# Patient Record
Sex: FEMALE | Race: WHITE | Employment: FULL TIME | URBAN - METROPOLITAN AREA
[De-identification: names, ages, dates, MRNs, and addresses within clinical notes are randomized per-mention and may not be internally consistent; named-entity substitution may affect disease eponyms.]

---

## 2017-01-17 ENCOUNTER — TELEPHONE (OUTPATIENT)
Dept: INTERNAL MEDICINE CLINIC | Facility: CLINIC | Age: 77
End: 2017-01-17

## 2017-01-17 DIAGNOSIS — Z12.31 SCREENING MAMMOGRAM, ENCOUNTER FOR: Primary | ICD-10-CM

## 2017-01-17 NOTE — TELEPHONE ENCOUNTER
Please fax order for mammogram please fax [phone] to [facility] in order for her to make an appointment

## 2017-03-10 RX ORDER — LISINOPRIL 20 MG/1
TABLET ORAL
Qty: 30 TABLET | Refills: 2 | OUTPATIENT
Start: 2017-03-10

## 2017-04-10 ENCOUNTER — TELEPHONE (OUTPATIENT)
Dept: INTERNAL MEDICINE CLINIC | Facility: CLINIC | Age: 77
End: 2017-04-10

## 2017-04-10 NOTE — TELEPHONE ENCOUNTER
Patient c/o severe dizziness that started yesterday morning. Can not get into office unable to drive would need evening appointment patient is asking for your advice.

## 2017-04-10 NOTE — TELEPHONE ENCOUNTER
Didn't get message earlier. Had severe vertigo yesterday, was bad earlier today and now better. When moving her head she was so nauseous she vomited. Better today. Recommend that she see ENT, Dr Celsa Rivera.

## 2017-05-31 RX ORDER — GABAPENTIN 100 MG/1
CAPSULE ORAL
Qty: 90 CAPSULE | Refills: 0 | Status: SHIPPED | OUTPATIENT
Start: 2017-05-31 | End: 2018-04-13

## 2017-06-26 ENCOUNTER — OFFICE VISIT (OUTPATIENT)
Dept: INTERNAL MEDICINE CLINIC | Facility: CLINIC | Age: 77
End: 2017-06-26

## 2017-06-26 VITALS
OXYGEN SATURATION: 99 % | WEIGHT: 143 LBS | TEMPERATURE: 98 F | BODY MASS INDEX: 25 KG/M2 | HEART RATE: 59 BPM | DIASTOLIC BLOOD PRESSURE: 86 MMHG | SYSTOLIC BLOOD PRESSURE: 160 MMHG

## 2017-06-26 DIAGNOSIS — I10 HYPERTENSION, BENIGN: Primary | ICD-10-CM

## 2017-06-26 PROCEDURE — 99212 OFFICE O/P EST SF 10 MIN: CPT | Performed by: INTERNAL MEDICINE

## 2017-06-26 PROCEDURE — 99213 OFFICE O/P EST LOW 20 MIN: CPT | Performed by: INTERNAL MEDICINE

## 2017-06-26 RX ORDER — HYDROCHLOROTHIAZIDE 12.5 MG/1
12.5 TABLET ORAL DAILY
Qty: 30 TABLET | Refills: 3 | Status: SHIPPED | OUTPATIENT
Start: 2017-06-26 | End: 2017-10-22

## 2017-06-26 NOTE — PROGRESS NOTES
HPI:    Patient ID: Ana Paula Alvarez is a 78year old female. HPI  Hypertension  Patient is here for follow up of hypertension. BP at home: 140 regularly Has been compliant with medications.   Exercise level: somewhat active and has been following low salt d Comment:Other reaction(s): tongue swells, mouth blisters  PHYSICAL EXAM:    Physical Exam   Vitals reviewed. Constitutional: She appears well-developed and well-nourished. HENT:   Head: Normocephalic and atraumatic.    Cardiovascular: Normal rate and re

## 2017-06-26 NOTE — PATIENT INSTRUCTIONS
ASSESSMENT/PLAN:   Diagnoses and all orders for this visit:    Hypertension, benign    BP overall running a little high here and also at home, will add small dose of HCTZ

## 2017-07-04 RX ORDER — ATENOLOL 50 MG/1
TABLET ORAL
Qty: 30 TABLET | Refills: 0 | Status: SHIPPED | OUTPATIENT
Start: 2017-07-04 | End: 2017-07-26

## 2017-07-26 ENCOUNTER — OFFICE VISIT (OUTPATIENT)
Dept: INTERNAL MEDICINE CLINIC | Facility: CLINIC | Age: 77
End: 2017-07-26

## 2017-07-26 VITALS
SYSTOLIC BLOOD PRESSURE: 98 MMHG | TEMPERATURE: 98 F | DIASTOLIC BLOOD PRESSURE: 60 MMHG | HEART RATE: 50 BPM | WEIGHT: 144 LBS | HEIGHT: 63 IN | BODY MASS INDEX: 25.52 KG/M2

## 2017-07-26 DIAGNOSIS — I10 HYPERTENSION, BENIGN: Primary | ICD-10-CM

## 2017-07-26 LAB
ANION GAP SERPL CALC-SCNC: 9 MMOL/L (ref 0–18)
BUN SERPL-MCNC: 16 MG/DL (ref 8–20)
BUN/CREAT SERPL: 12.3 (ref 10–20)
CALCIUM SERPL-MCNC: 9.2 MG/DL (ref 8.5–10.5)
CHLORIDE SERPL-SCNC: 93 MMOL/L (ref 95–110)
CO2 SERPL-SCNC: 29 MMOL/L (ref 22–32)
CREAT SERPL-MCNC: 1.3 MG/DL (ref 0.5–1.5)
GLUCOSE SERPL-MCNC: 98 MG/DL (ref 70–99)
OSMOLALITY UR CALC.SUM OF ELEC: 273 MOSM/KG (ref 275–295)
POTASSIUM SERPL-SCNC: 3.5 MMOL/L (ref 3.3–5.1)
SODIUM SERPL-SCNC: 131 MMOL/L (ref 136–144)

## 2017-07-26 PROCEDURE — 36415 COLL VENOUS BLD VENIPUNCTURE: CPT | Performed by: INTERNAL MEDICINE

## 2017-07-26 PROCEDURE — 99213 OFFICE O/P EST LOW 20 MIN: CPT | Performed by: INTERNAL MEDICINE

## 2017-07-26 PROCEDURE — 99212 OFFICE O/P EST SF 10 MIN: CPT | Performed by: INTERNAL MEDICINE

## 2017-07-26 RX ORDER — ATENOLOL 50 MG/1
50 TABLET ORAL
Qty: 30 TABLET | Refills: 6 | Status: SHIPPED | OUTPATIENT
Start: 2017-07-26 | End: 2017-12-01

## 2017-07-26 NOTE — PROGRESS NOTES
HPI:    Patient ID: Laurel Pollard is a 78year old female. HPI  Hypertension  Patient is here for follow up of hypertension. BP at home: not checking recently  Has been compliant with medications.   Exercise level: somewhat active and has been following l tongue swells, mouth blisters  Trimethoprim                Comment:Other reaction(s): tongue swells, mouth blisters  PHYSICAL EXAM:    Physical Exam   Vitals reviewed. Constitutional: She appears well-developed and well-nourished.    Cardiovascular: Nita Ulloa

## 2017-07-26 NOTE — PATIENT INSTRUCTIONS
ASSESSMENT/PLAN:   Diagnoses and all orders for this visit:    Hypertension, benign  BP doing well, in fact too low. Recommend that she take the water pill every other day.

## 2017-07-27 ENCOUNTER — TELEPHONE (OUTPATIENT)
Dept: INTERNAL MEDICINE CLINIC | Facility: CLINIC | Age: 77
End: 2017-07-27

## 2017-07-27 NOTE — TELEPHONE ENCOUNTER
Pt informed of results and instructions to take water pill eod and to repeat bmp in 2 weeks, order written.

## 2017-08-08 ENCOUNTER — NURSE ONLY (OUTPATIENT)
Dept: INTERNAL MEDICINE CLINIC | Facility: CLINIC | Age: 77
End: 2017-08-08

## 2017-08-08 DIAGNOSIS — I10 ESSENTIAL HYPERTENSION: ICD-10-CM

## 2017-08-08 LAB
ANION GAP SERPL CALC-SCNC: 7 MMOL/L (ref 0–18)
BUN SERPL-MCNC: 12 MG/DL (ref 8–20)
BUN/CREAT SERPL: 12.9 (ref 10–20)
CALCIUM SERPL-MCNC: 9.2 MG/DL (ref 8.5–10.5)
CHLORIDE SERPL-SCNC: 96 MMOL/L (ref 95–110)
CO2 SERPL-SCNC: 28 MMOL/L (ref 22–32)
CREAT SERPL-MCNC: 0.93 MG/DL (ref 0.5–1.5)
GLUCOSE SERPL-MCNC: 69 MG/DL (ref 70–99)
OSMOLALITY UR CALC.SUM OF ELEC: 270 MOSM/KG (ref 275–295)
POTASSIUM SERPL-SCNC: 4.2 MMOL/L (ref 3.3–5.1)
SODIUM SERPL-SCNC: 131 MMOL/L (ref 136–144)

## 2017-08-08 PROCEDURE — 36415 COLL VENOUS BLD VENIPUNCTURE: CPT | Performed by: INTERNAL MEDICINE

## 2017-10-06 RX ORDER — LISINOPRIL 20 MG/1
TABLET ORAL
Qty: 30 TABLET | Refills: 2 | Status: SHIPPED | OUTPATIENT
Start: 2017-10-06 | End: 2017-12-13

## 2017-10-09 ENCOUNTER — OFFICE VISIT (OUTPATIENT)
Dept: INTERNAL MEDICINE CLINIC | Facility: CLINIC | Age: 77
End: 2017-10-09

## 2017-10-09 VITALS
WEIGHT: 143 LBS | BODY MASS INDEX: 25.34 KG/M2 | HEIGHT: 63 IN | DIASTOLIC BLOOD PRESSURE: 71 MMHG | HEART RATE: 54 BPM | SYSTOLIC BLOOD PRESSURE: 119 MMHG

## 2017-10-09 DIAGNOSIS — I10 HYPERTENSION, BENIGN: Primary | ICD-10-CM

## 2017-10-09 PROCEDURE — 99212 OFFICE O/P EST SF 10 MIN: CPT | Performed by: INTERNAL MEDICINE

## 2017-10-09 PROCEDURE — 99213 OFFICE O/P EST LOW 20 MIN: CPT | Performed by: INTERNAL MEDICINE

## 2017-10-09 NOTE — PROGRESS NOTES
HPI:    Patient ID: Pauline Warner is a 78year old female. HPI  Hypertension  Patient is here for follow up of hypertension. BP at home: doing well, no further lightheadedness on the every other day diuretic  Has been compliant with medications.   Exercis Sulfamethoxazole            Comment:Other reaction(s): tongue swells, mouth blisters  Trimethoprim                Comment:Other reaction(s): tongue swells, mouth blisters  PHYSICAL EXAM:    Physical Exam   Vitals reviewed.    Constitutional: She appears w

## 2017-10-09 NOTE — PATIENT INSTRUCTIONS
ASSESSMENT/PLAN:   Diagnoses and all orders for this visit:    Hypertension, benign    BP overall doing well. To continue current meds. Recommend checking labs for her electrolytes.      Patient has been advised of the importance of flu vaccine but declines

## 2017-10-10 NOTE — PROGRESS NOTES
Labs normal, magnesium good but the potassium slightly on the low side, increase K foods, banana, OJ, potatoes, etc. Should help the cramping. Doesn't need recheck until next appt.  Cholesterol good, TG up slightly because she wasn't fully fasting

## 2017-10-23 RX ORDER — HYDROCHLOROTHIAZIDE 12.5 MG/1
TABLET ORAL
Qty: 30 TABLET | Refills: 3 | Status: SHIPPED | OUTPATIENT
Start: 2017-10-23 | End: 2018-02-15

## 2017-11-30 ENCOUNTER — TELEPHONE (OUTPATIENT)
Dept: INTERNAL MEDICINE CLINIC | Facility: CLINIC | Age: 77
End: 2017-11-30

## 2017-12-01 RX ORDER — METOPROLOL SUCCINATE 50 MG/1
50 TABLET, EXTENDED RELEASE ORAL DAILY
Qty: 30 TABLET | Refills: 3 | Status: SHIPPED | OUTPATIENT
Start: 2017-12-01 | End: 2018-03-18

## 2017-12-13 RX ORDER — LISINOPRIL 20 MG/1
TABLET ORAL
Qty: 30 TABLET | Refills: 2 | Status: SHIPPED | OUTPATIENT
Start: 2017-12-13 | End: 2018-03-18

## 2018-02-14 ENCOUNTER — TELEPHONE (OUTPATIENT)
Dept: INTERNAL MEDICINE CLINIC | Facility: CLINIC | Age: 78
End: 2018-02-14

## 2018-02-14 DIAGNOSIS — Z12.31 SCREENING MAMMOGRAM, ENCOUNTER FOR: Primary | ICD-10-CM

## 2018-02-15 RX ORDER — HYDROCHLOROTHIAZIDE 12.5 MG/1
TABLET ORAL
Qty: 30 TABLET | Refills: 1 | Status: SHIPPED | OUTPATIENT
Start: 2018-02-15 | End: 2018-04-12

## 2018-03-18 RX ORDER — LISINOPRIL 20 MG/1
TABLET ORAL
Qty: 90 TABLET | Refills: 0 | Status: SHIPPED | OUTPATIENT
Start: 2018-03-18 | End: 2018-04-13

## 2018-03-18 RX ORDER — METOPROLOL SUCCINATE 50 MG/1
50 TABLET, EXTENDED RELEASE ORAL DAILY
Qty: 90 TABLET | Refills: 0 | Status: SHIPPED | OUTPATIENT
Start: 2018-03-18 | End: 2018-04-13

## 2018-04-12 RX ORDER — HYDROCHLOROTHIAZIDE 12.5 MG/1
TABLET ORAL
Qty: 30 TABLET | Refills: 1 | Status: SHIPPED | OUTPATIENT
Start: 2018-04-12 | End: 2018-04-13

## 2018-04-13 ENCOUNTER — OFFICE VISIT (OUTPATIENT)
Dept: INTERNAL MEDICINE CLINIC | Facility: CLINIC | Age: 78
End: 2018-04-13

## 2018-04-13 VITALS
SYSTOLIC BLOOD PRESSURE: 124 MMHG | BODY MASS INDEX: 25.69 KG/M2 | HEART RATE: 53 BPM | RESPIRATION RATE: 12 BRPM | WEIGHT: 145 LBS | DIASTOLIC BLOOD PRESSURE: 74 MMHG | TEMPERATURE: 97 F | HEIGHT: 63 IN

## 2018-04-13 DIAGNOSIS — I10 HYPERTENSION, BENIGN: Primary | ICD-10-CM

## 2018-04-13 DIAGNOSIS — H61.23 CERUMEN DEBRIS ON TYMPANIC MEMBRANE OF BOTH EARS: ICD-10-CM

## 2018-04-13 PROCEDURE — 99212 OFFICE O/P EST SF 10 MIN: CPT | Performed by: INTERNAL MEDICINE

## 2018-04-13 PROCEDURE — 99213 OFFICE O/P EST LOW 20 MIN: CPT | Performed by: INTERNAL MEDICINE

## 2018-04-13 RX ORDER — GABAPENTIN 100 MG/1
CAPSULE ORAL
Qty: 270 CAPSULE | Refills: 1 | Status: SHIPPED | OUTPATIENT
Start: 2018-04-13 | End: 2019-02-16

## 2018-04-13 RX ORDER — METOPROLOL SUCCINATE 50 MG/1
50 TABLET, EXTENDED RELEASE ORAL DAILY
Qty: 90 TABLET | Refills: 1 | Status: SHIPPED | OUTPATIENT
Start: 2018-04-13 | End: 2018-12-17

## 2018-04-13 RX ORDER — HYDROCHLOROTHIAZIDE 12.5 MG/1
12.5 TABLET ORAL
Qty: 30 TABLET | Refills: 1 | Status: SHIPPED | OUTPATIENT
Start: 2018-04-13 | End: 2018-07-08 | Stop reason: SDUPTHER

## 2018-04-13 RX ORDER — DICLOFENAC SODIUM 75 MG/1
75 TABLET, DELAYED RELEASE ORAL 2 TIMES DAILY
Qty: 180 TABLET | Refills: 1 | Status: SHIPPED | OUTPATIENT
Start: 2018-04-13 | End: 2019-03-20

## 2018-04-13 RX ORDER — LISINOPRIL 20 MG/1
20 TABLET ORAL
Qty: 90 TABLET | Refills: 1 | Status: SHIPPED | OUTPATIENT
Start: 2018-04-13 | End: 2018-12-17

## 2018-04-13 NOTE — PATIENT INSTRUCTIONS
ASSESSMENT/PLAN:   Diagnoses and all orders for this visit:    Hypertension, benign  BP overall doing very well, continue current meds and regular exercise.     Cerumen debris on tympanic membrane of both ears  Irrigated clear

## 2018-04-13 NOTE — PROGRESS NOTES
HPI:    Patient ID: Ana Paula Alvarez is a 78year old female. HPI  Hypertension  Patient is here for follow up of hypertension. BP at home: doing well. Has been compliant with medications.   Exercise level: moderately active and has been following low salt triamcinolone acetonide (KENALOG) 0.1 % Apply Externally Ointment Apply thin film bid to effected area Disp: 30 g Rfl: 0     Allergies:  Bananas                   Sulfamethoxazole            Comment:Other reaction(s): tongue swells, mouth blisters  Trime

## 2018-07-08 RX ORDER — HYDROCHLOROTHIAZIDE 12.5 MG/1
TABLET ORAL
Qty: 90 TABLET | Refills: 0 | Status: SHIPPED | OUTPATIENT
Start: 2018-07-08 | End: 2019-03-18

## 2018-07-09 NOTE — TELEPHONE ENCOUNTER
Refill passed per [facility] protocol.   Hypertensive Medications  Protocol Criteria:  · Appointment scheduled in the past 6 months or in the next 3 months  · BMP or CMP in the past 12 months  · Creatinine result < 2  Recent Outpatient Visits

## 2018-12-05 ENCOUNTER — OFFICE VISIT (OUTPATIENT)
Dept: INTERNAL MEDICINE CLINIC | Facility: CLINIC | Age: 78
End: 2018-12-05
Payer: COMMERCIAL

## 2018-12-05 VITALS
SYSTOLIC BLOOD PRESSURE: 180 MMHG | HEART RATE: 60 BPM | OXYGEN SATURATION: 97 % | BODY MASS INDEX: 27.16 KG/M2 | DIASTOLIC BLOOD PRESSURE: 80 MMHG | HEIGHT: 62.5 IN | TEMPERATURE: 98 F | WEIGHT: 151.38 LBS

## 2018-12-05 DIAGNOSIS — I10 HTN (HYPERTENSION), BENIGN: ICD-10-CM

## 2018-12-05 DIAGNOSIS — M25.511 ACUTE PAIN OF RIGHT SHOULDER: Primary | ICD-10-CM

## 2018-12-05 PROCEDURE — 99214 OFFICE O/P EST MOD 30 MIN: CPT | Performed by: INTERNAL MEDICINE

## 2018-12-06 NOTE — PROGRESS NOTES
HPI:    Patient ID: Jani Panchal is a 70year old female. HPI right arm pain  To her this is ongoing for a couple of weeks now starts  from her shoulder move down to her arm,  there is limitation of movement.   There is no tingling or  numbness sensation Hematological: Negative for adenopathy. Does not bruise/bleed easily. Psychiatric/Behavioral: Negative for agitation, behavioral problems, confusion, hallucinations and sleep disturbance. The patient is not nervous/anxious.             Current Outpatient HENT:   Head: Normocephalic and atraumatic. Right Ear: Tympanic membrane and external ear normal. No drainage or tenderness. No mastoid tenderness. Tympanic membrane is not erythematous.    Left Ear: Tympanic membrane and external ear normal. No drainage She has no axillary adenopathy. Neurological: She is alert and oriented to person, place, and time. She has normal reflexes. No cranial nerve deficit. She exhibits normal muscle tone. Coordination normal.   Skin: Skin is warm, dry and intact.  No rash n

## 2018-12-14 ENCOUNTER — TELEPHONE (OUTPATIENT)
Dept: INTERNAL MEDICINE CLINIC | Facility: CLINIC | Age: 78
End: 2018-12-14

## 2018-12-14 NOTE — TELEPHONE ENCOUNTER
I spoke with her , I dont have the results, she will check with [facility] and ask them to fax or she will drop at the office

## 2018-12-14 NOTE — TELEPHONE ENCOUNTER
Her blood pressure noted on higher side, spoke with her about the blood pressure is on the higher side , will increase her llisinopril to 20 mg bid instead , check blood pressure at home an call with bp readings within one week

## 2018-12-14 NOTE — TELEPHONE ENCOUNTER
BLOOD PRESSURE NUMBERS    12/05/18 PM   180/80  12/05/18 PM    157/68  12/06/18 PM     151/65  12/09/18 PM      161/56  12/10/18  PM     172/78  12/11/18  PM     187/78  12/12/18   PM    174/78

## 2018-12-15 ENCOUNTER — TELEPHONE (OUTPATIENT)
Dept: INTERNAL MEDICINE CLINIC | Facility: CLINIC | Age: 78
End: 2018-12-15

## 2018-12-15 NOTE — TELEPHONE ENCOUNTER
Current Outpatient Medications:   ••  lisinopril 20 MG Oral Tab, Take 1 tablet (20 mg total) by mouth once daily. , Disp: 90 tablet, Rfl: 1  •  Metoprolol Succinate ER 50 MG Oral Tablet 24 Hr, Take 1 tablet (50 mg total) by mouth daily. , Disp: 90 tablet,

## 2018-12-15 NOTE — TELEPHONE ENCOUNTER
Patient came to the office to drop off  Xray results from [facility] (on your dek in [place]) patient is aware you are not in until Monday 12-18-18

## 2018-12-17 RX ORDER — METOPROLOL SUCCINATE 50 MG/1
50 TABLET, EXTENDED RELEASE ORAL DAILY
Qty: 90 TABLET | Refills: 1 | Status: SHIPPED | OUTPATIENT
Start: 2018-12-17 | End: 2019-06-15

## 2018-12-17 RX ORDER — LISINOPRIL 20 MG/1
20 TABLET ORAL
Qty: 90 TABLET | Refills: 1 | Status: SHIPPED | OUTPATIENT
Start: 2018-12-17 | End: 2019-06-15

## 2018-12-17 NOTE — TELEPHONE ENCOUNTER
I did review her xr , she does have  bone projections , enthesophyte  , at the attachment of  Tendon or ligament, and her pain can be due to that , if not better I can refer her to see ortho

## 2018-12-18 NOTE — TELEPHONE ENCOUNTER
I did talk to her  Informed about the xr results , she states that she is feeling better and she doesn't want ot see ortho  For  Now, if not better she will call us

## 2019-02-18 RX ORDER — GABAPENTIN 100 MG/1
CAPSULE ORAL
Qty: 270 CAPSULE | Refills: 1 | Status: SHIPPED | OUTPATIENT
Start: 2019-02-18 | End: 2019-08-14

## 2019-03-18 ENCOUNTER — TELEPHONE (OUTPATIENT)
Dept: INTERNAL MEDICINE CLINIC | Facility: CLINIC | Age: 79
End: 2019-03-18

## 2019-03-18 RX ORDER — HYDROCHLOROTHIAZIDE 12.5 MG/1
12.5 TABLET ORAL
Qty: 90 TABLET | Refills: 0 | Status: SHIPPED | OUTPATIENT
Start: 2019-03-18 | End: 2019-06-14

## 2019-03-19 ENCOUNTER — TELEPHONE (OUTPATIENT)
Dept: INTERNAL MEDICINE CLINIC | Facility: CLINIC | Age: 79
End: 2019-03-19

## 2019-03-19 NOTE — TELEPHONE ENCOUNTER
Current Outpatient Medications:   •  •  Diclofenac Sodium 75 MG Oral Tab EC, Take 1 tablet (75 mg total) by mouth 2 (two) times daily. , Disp: 180 tablet, Rfl: 1  •

## 2019-03-20 RX ORDER — DICLOFENAC SODIUM 75 MG/1
75 TABLET, DELAYED RELEASE ORAL 2 TIMES DAILY
Qty: 180 TABLET | Refills: 0 | Status: SHIPPED | OUTPATIENT
Start: 2019-03-20 | End: 2019-04-15

## 2019-04-01 ENCOUNTER — TELEPHONE (OUTPATIENT)
Dept: INTERNAL MEDICINE CLINIC | Facility: CLINIC | Age: 79
End: 2019-04-01

## 2019-04-01 NOTE — TELEPHONE ENCOUNTER
Patient called requesting order for bilateral yearly mammogram, please mail to patients home, she is going to [facility].

## 2019-04-12 ENCOUNTER — MED REC SCAN ONLY (OUTPATIENT)
Dept: INTERNAL MEDICINE CLINIC | Facility: CLINIC | Age: 79
End: 2019-04-12

## 2019-04-15 ENCOUNTER — OFFICE VISIT (OUTPATIENT)
Dept: INTERNAL MEDICINE CLINIC | Facility: CLINIC | Age: 79
End: 2019-04-15
Payer: COMMERCIAL

## 2019-04-15 VITALS
HEART RATE: 59 BPM | HEIGHT: 62.5 IN | TEMPERATURE: 98 F | BODY MASS INDEX: 26.91 KG/M2 | WEIGHT: 150 LBS | DIASTOLIC BLOOD PRESSURE: 90 MMHG | SYSTOLIC BLOOD PRESSURE: 150 MMHG

## 2019-04-15 DIAGNOSIS — E78.00 HYPERCHOLESTEROLEMIA: Primary | ICD-10-CM

## 2019-04-15 DIAGNOSIS — I10 ESSENTIAL HYPERTENSION: ICD-10-CM

## 2019-04-15 PROCEDURE — 99213 OFFICE O/P EST LOW 20 MIN: CPT | Performed by: INTERNAL MEDICINE

## 2019-04-15 RX ORDER — DICLOFENAC SODIUM 75 MG/1
75 TABLET, DELAYED RELEASE ORAL 2 TIMES DAILY
COMMUNITY
End: 2020-08-05

## 2019-04-15 NOTE — PATIENT INSTRUCTIONS
htn bp noted on the higher side - watch diet , avoid salty food, continue with metoprolol, lisinopril and hctz every day , check bp at home and call with bp readings within one week    See me in 3 months

## 2019-04-15 NOTE — PROGRESS NOTES
HPI:    Patient ID: Patria Velasquez is a 70year old female. HPI she is here today for follow up on her htn   She states that she is doing ok, she is checking her blood pressure at home and per her is around 140/75 .  She takes her medication regularly she hydrochlorothiazide 12.5 MG Oral Tab Take 1 tablet (12.5 mg total) by mouth once daily. Disp: 90 tablet Rfl: 0   GABAPENTIN 100 MG Oral Cap TAKE 1 CAPSULE (100 MG TOTAL) BY MOUTH 3 (THREE) TIMES DAILY.  Disp: 270 capsule Rfl: 1   Metoprolol Succinate ER 5 tenderness. Mouth/Throat: Uvula is midline, oropharynx is clear and moist and mucous membranes are normal. Mucous membranes are not cyanotic. No oropharyngeal exudate, posterior oropharyngeal edema or posterior oropharyngeal erythema.    Eyes: Pupils are within 1 week continue with metoprolol lisinopril and HCTZ every day instead of every other day.     Orders Placed This Encounter      Lipid Panel [E]      Comp Metabolic Panel (14)      Meds This Visit:  Requested Prescriptions      No prescriptions reques

## 2019-04-29 ENCOUNTER — TELEPHONE (OUTPATIENT)
Dept: INTERNAL MEDICINE CLINIC | Facility: CLINIC | Age: 79
End: 2019-04-29

## 2019-06-14 RX ORDER — HYDROCHLOROTHIAZIDE 12.5 MG/1
TABLET ORAL
Qty: 90 TABLET | Refills: 1 | Status: SHIPPED | OUTPATIENT
Start: 2019-06-14 | End: 2019-12-18

## 2019-06-14 NOTE — TELEPHONE ENCOUNTER
Please review; protocol failed. Requested Prescriptions     Pending Prescriptions Disp Refills   • Metoprolol Succinate ER 50 MG Oral Tablet 24 Hr 90 tablet 1     Sig: Take 1 tablet (50 mg total) by mouth daily.    • lisinopril 20 MG Oral Tab 90 tablet 1

## 2019-06-15 RX ORDER — METOPROLOL SUCCINATE 50 MG/1
50 TABLET, EXTENDED RELEASE ORAL DAILY
Qty: 90 TABLET | Refills: 1 | Status: SHIPPED | OUTPATIENT
Start: 2019-06-15 | End: 2019-12-18

## 2019-06-15 RX ORDER — LISINOPRIL 20 MG/1
20 TABLET ORAL
Qty: 90 TABLET | Refills: 1 | Status: SHIPPED | OUTPATIENT
Start: 2019-06-15 | End: 2019-12-18

## 2019-08-14 RX ORDER — GABAPENTIN 100 MG/1
100 CAPSULE ORAL 3 TIMES DAILY
Qty: 270 CAPSULE | Refills: 1 | Status: SHIPPED | OUTPATIENT
Start: 2019-08-14 | End: 2020-02-17

## 2019-08-14 NOTE — TELEPHONE ENCOUNTER
Rx request for Gabapentin 100 mg, please review and sign off if appropriate. Thank you.      Refill Protocol Appointment Criteria  · Appointment scheduled in the past 6 months or in the next 3 months  Recent Outpatient Visits            4 months ago Hyperch

## 2019-08-14 NOTE — TELEPHONE ENCOUNTER
Current Outpatient Medications:     •  GABAPENTIN 100 MG Oral Cap, TAKE 1 CAPSULE (100 MG TOTAL) BY MOUTH 3 (THREE) TIMES DAILY. , Disp: 270 capsule, Rfl: 1

## 2019-08-15 ENCOUNTER — TELEPHONE (OUTPATIENT)
Dept: INTERNAL MEDICINE CLINIC | Facility: CLINIC | Age: 79
End: 2019-08-15

## 2019-08-15 RX ORDER — DICLOFENAC SODIUM 75 MG/1
TABLET, DELAYED RELEASE ORAL
Qty: 90 TABLET | Refills: 1 | Status: SHIPPED | OUTPATIENT
Start: 2019-08-15 | End: 2020-02-18

## 2019-08-15 NOTE — TELEPHONE ENCOUNTER
Refill passed per [facility] protocol.   Refill Protocol Appointment Criteria  · Appointment scheduled in the past 6 months or in the next 3 months  Recent Outpatient Visits            4 months ago Hypercholesterolemia    [facility], [address],

## 2019-11-11 ENCOUNTER — OFFICE VISIT (OUTPATIENT)
Dept: INTERNAL MEDICINE CLINIC | Facility: CLINIC | Age: 79
End: 2019-11-11
Payer: COMMERCIAL

## 2019-11-11 VITALS
HEIGHT: 62.5 IN | SYSTOLIC BLOOD PRESSURE: 135 MMHG | DIASTOLIC BLOOD PRESSURE: 80 MMHG | TEMPERATURE: 98 F | HEART RATE: 57 BPM | WEIGHT: 150 LBS | RESPIRATION RATE: 18 BRPM | BODY MASS INDEX: 26.91 KG/M2

## 2019-11-11 DIAGNOSIS — Z00.00 ANNUAL PHYSICAL EXAM: Primary | ICD-10-CM

## 2019-11-11 DIAGNOSIS — Z28.21 IMMUNIZATION NOT CARRIED OUT BECAUSE OF PATIENT REFUSAL: ICD-10-CM

## 2019-11-11 PROCEDURE — 99213 OFFICE O/P EST LOW 20 MIN: CPT | Performed by: INTERNAL MEDICINE

## 2019-11-11 NOTE — PATIENT INSTRUCTIONS
htn - controlled, advised her to watch her diet , avoid salty food , check blood pressure at home and bring log book next visit .  Watch diet , avoid salty food , continue with current medication     blood work ,   Refusing flu shot

## 2019-11-11 NOTE — PROGRESS NOTES
HPI:    Patient ID: Dafne Alfaro is a 70year old female. HPI She is here for follow up on her htn.  She is not checking her blood pressure  at home regularly only once in a while and per her Is around 130   BP Readings from Last 3 Encounters:  11/11/19 by mouth 3 (three) times daily. 270 capsule 1   • Metoprolol Succinate ER 50 MG Oral Tablet 24 Hr Take 1 tablet (50 mg total) by mouth daily. 90 tablet 1   • lisinopril 20 MG Oral Tab Take 1 tablet (20 mg total) by mouth once daily.  90 tablet 1   • HYDROCH oropharynx is clear and moist and mucous membranes are normal. Mucous membranes are not cyanotic. No oropharyngeal exudate, posterior oropharyngeal edema or posterior oropharyngeal erythema. Eyes: Pupils are equal, round, and reactive to light.  Conjuncti this encounter.       Meds This Visit:  Requested Prescriptions      No prescriptions requested or ordered in this encounter       Imaging & Referrals:  None        CC#[id number]

## 2019-12-19 NOTE — TELEPHONE ENCOUNTER
Please review; protocol failed.     Requested Prescriptions     Pending Prescriptions Disp Refills   • LISINOPRIL 20 MG Oral Tab [Pharmacy Med Name: LISINOPRIL 20 MG TABLET] 30 tablet 5     Sig: TAKE 1 TABLET BY MOUTH EVERY DAY   • HYDROCHLOROTHIAZIDE 12.5

## 2019-12-20 RX ORDER — LISINOPRIL 20 MG/1
TABLET ORAL
Qty: 90 TABLET | Refills: 1 | Status: SHIPPED | OUTPATIENT
Start: 2019-12-20 | End: 2020-06-09

## 2019-12-20 RX ORDER — METOPROLOL SUCCINATE 50 MG/1
TABLET, EXTENDED RELEASE ORAL
Qty: 90 TABLET | Refills: 1 | Status: SHIPPED | OUTPATIENT
Start: 2019-12-20 | End: 2020-06-09

## 2019-12-20 RX ORDER — HYDROCHLOROTHIAZIDE 12.5 MG/1
TABLET ORAL
Qty: 90 TABLET | Refills: 1 | Status: SHIPPED | OUTPATIENT
Start: 2019-12-20 | End: 2020-06-09

## 2020-02-10 RX ORDER — ATORVASTATIN CALCIUM 20 MG/1
TABLET, FILM COATED ORAL
Qty: 90 TABLET | Refills: 1 | Status: SHIPPED | OUTPATIENT
Start: 2020-02-10 | End: 2020-08-01

## 2020-02-10 NOTE — TELEPHONE ENCOUNTER
Please review; protocol failed.     Cholesterol Medication Protocol Failed2/9 9:36 AM   Last LDL < 130

## 2020-02-17 RX ORDER — GABAPENTIN 100 MG/1
100 CAPSULE ORAL 3 TIMES DAILY
Qty: 270 CAPSULE | Refills: 1 | Status: SHIPPED | OUTPATIENT
Start: 2020-02-17 | End: 2020-05-13

## 2020-02-17 NOTE — TELEPHONE ENCOUNTER
Current Outpatient Medications:     •  gabapentin 100 MG Oral Cap, Take 1 capsule (100 mg total) by mouth 3 (three) times daily. , Disp: 270 capsule, Rfl: 1 REFILL

## 2020-02-17 NOTE — TELEPHONE ENCOUNTER
Refill passed per [facility], [facility] protocol.   Refill Protocol Appointment Criteria  · Appointment scheduled in the past 6 months or in the next 3 months  Recent Outpatient Visits            3 months ago Annual physical exam    [facility], [facility], [address],

## 2020-02-18 NOTE — TELEPHONE ENCOUNTER
Current Outpatient Medications:   •  •  DICLOFENAC SODIUM 75 MG Oral Tab EC, TAKE 1 TABLET BY MOUTH TWICE A DAY, Disp: 90 tablet, Rfl: 1  •

## 2020-02-19 RX ORDER — DICLOFENAC SODIUM 75 MG/1
75 TABLET, DELAYED RELEASE ORAL 2 TIMES DAILY
Qty: 90 TABLET | Refills: 1 | Status: SHIPPED | OUTPATIENT
Start: 2020-02-19 | End: 2020-05-10

## 2020-02-19 NOTE — TELEPHONE ENCOUNTER
Review pended refill request as it does not fall under a protocol.     Last Rx: 8/15/19  LOV: 3 months ago

## 2020-05-10 RX ORDER — DICLOFENAC SODIUM 75 MG/1
TABLET, DELAYED RELEASE ORAL
Qty: 60 TABLET | Refills: 2 | Status: SHIPPED | OUTPATIENT
Start: 2020-05-10 | End: 2020-05-13

## 2020-05-13 ENCOUNTER — TELEPHONE (OUTPATIENT)
Dept: INTERNAL MEDICINE CLINIC | Facility: CLINIC | Age: 80
End: 2020-05-13

## 2020-05-13 ENCOUNTER — OFFICE VISIT (OUTPATIENT)
Dept: INTERNAL MEDICINE CLINIC | Facility: CLINIC | Age: 80
End: 2020-05-13
Payer: COMMERCIAL

## 2020-05-13 VITALS
DIASTOLIC BLOOD PRESSURE: 75 MMHG | HEIGHT: 62 IN | OXYGEN SATURATION: 98 % | BODY MASS INDEX: 27.05 KG/M2 | TEMPERATURE: 98 F | WEIGHT: 147 LBS | HEART RATE: 59 BPM | SYSTOLIC BLOOD PRESSURE: 130 MMHG

## 2020-05-13 DIAGNOSIS — Z00.00 ANNUAL PHYSICAL EXAM: ICD-10-CM

## 2020-05-13 DIAGNOSIS — Z12.31 ENCOUNTER FOR SCREENING MAMMOGRAM FOR BREAST CANCER: Primary | ICD-10-CM

## 2020-05-13 DIAGNOSIS — Z78.0 MENOPAUSE: ICD-10-CM

## 2020-05-13 PROCEDURE — 99397 PER PM REEVAL EST PAT 65+ YR: CPT | Performed by: INTERNAL MEDICINE

## 2020-05-13 NOTE — PROGRESS NOTES
HPI:   Monique Alvarez is a 70year old female who presents for a complete physical exam.     Wt Readings from Last 3 Encounters:  05/13/20 : 147 lb (66.7 kg)  11/11/19 : 150 lb (68 kg)  04/15/19 : 150 lb (68 kg)    Body mass index is 26.89 kg/m².      Cholest REVIEW OF SYSTEMS:     Constitutional Negative Chills, fatigue and fever. ENMT Negative Hearing loss, nasal drainage, pain in/around ear, sinus pressure and sore throat. Eyes Negative Eye pain and vision changes.    Respiratory Negative Cough, dyspn gallops, or rubs   Vascular Normal Pulses - Dorsalis pedis: Normal. Bruits - Carotids: Absent. Extremity Normal No edema. No varicosities. Abdomen Normal Inspection normal, BS active.  No abdominal tenderness or masses palpable   Musculoskeletal Normal

## 2020-05-13 NOTE — TELEPHONE ENCOUNTER
Patient is calling to see if she can just go to [facility] to get her mammogram done? She has gone there for over 20 years so they have all her records.      Will she need a referral for this or can she just go for her annual mammogram? Patient state

## 2020-05-13 NOTE — TELEPHONE ENCOUNTER
Patient informed of mammogram order needed in order to have testing completed at [facility]. Copy of order mailed to patient.

## 2020-06-08 ENCOUNTER — TELEPHONE (OUTPATIENT)
Dept: INTERNAL MEDICINE CLINIC | Facility: CLINIC | Age: 80
End: 2020-06-08

## 2020-06-08 NOTE — TELEPHONE ENCOUNTER
Patient would like a copy of her mammogram order faxed to [facility] at fax: [phone].  They will not schedule an appointment for her until the receive the order for her mammogram.

## 2020-06-09 RX ORDER — METOPROLOL SUCCINATE 50 MG/1
TABLET, EXTENDED RELEASE ORAL
Qty: 30 TABLET | Refills: 5 | Status: SHIPPED | OUTPATIENT
Start: 2020-06-09 | End: 2020-12-19

## 2020-06-09 RX ORDER — LISINOPRIL 20 MG/1
TABLET ORAL
Qty: 30 TABLET | Refills: 5 | Status: SHIPPED | OUTPATIENT
Start: 2020-06-09 | End: 2020-12-19

## 2020-06-09 RX ORDER — HYDROCHLOROTHIAZIDE 12.5 MG/1
TABLET ORAL
Qty: 30 TABLET | Refills: 5 | Status: SHIPPED | OUTPATIENT
Start: 2020-06-09 | End: 2021-05-19

## 2020-07-02 ENCOUNTER — NURSE TRIAGE (OUTPATIENT)
Dept: INTERNAL MEDICINE CLINIC | Facility: CLINIC | Age: 80
End: 2020-07-02

## 2020-07-02 NOTE — TELEPHONE ENCOUNTER
Action Requested: Summary for Provider     []  Critical Lab, Recommendations Needed  [] Need Additional Advice  []   FYI    []   Need Orders  [] Need Medications Sent to Pharmacy  []  Other     SUMMARY:appt made, injured right thumb trying to open a window

## 2020-07-03 ENCOUNTER — OFFICE VISIT (OUTPATIENT)
Dept: INTERNAL MEDICINE CLINIC | Facility: CLINIC | Age: 80
End: 2020-07-03
Payer: COMMERCIAL

## 2020-07-03 ENCOUNTER — HOSPITAL ENCOUNTER (OUTPATIENT)
Dept: GENERAL RADIOLOGY | Facility: HOSPITAL | Age: 80
Discharge: HOME | End: 2020-07-03
Attending: INTERNAL MEDICINE
Payer: COMMERCIAL

## 2020-07-03 ENCOUNTER — TELEPHONE (OUTPATIENT)
Dept: INTERNAL MEDICINE CLINIC | Facility: CLINIC | Age: 80
End: 2020-07-03

## 2020-07-03 VITALS
DIASTOLIC BLOOD PRESSURE: 80 MMHG | BODY MASS INDEX: 26.55 KG/M2 | OXYGEN SATURATION: 98 % | HEART RATE: 76 BPM | WEIGHT: 148 LBS | SYSTOLIC BLOOD PRESSURE: 135 MMHG | HEIGHT: 62.5 IN

## 2020-07-03 DIAGNOSIS — S69.91XA INJURY OF RIGHT THUMB, INITIAL ENCOUNTER: Primary | ICD-10-CM

## 2020-07-03 DIAGNOSIS — E78.00 HYPERCHOLESTEROLEMIA: ICD-10-CM

## 2020-07-03 DIAGNOSIS — S69.91XA INJURY OF RIGHT THUMB, INITIAL ENCOUNTER: ICD-10-CM

## 2020-07-03 LAB
ALBUMIN SERPL-MCNC: 4.2 G/DL (ref 3.4–5)
ALBUMIN/GLOB SERPL: 1.4 {RATIO} (ref 1–2)
ALP LIVER SERPL-CCNC: 80 U/L (ref 55–142)
ALT SERPL-CCNC: 37 U/L (ref 13–56)
ANION GAP SERPL CALC-SCNC: 10 MMOL/L (ref 0–18)
AST SERPL-CCNC: 20 U/L (ref 15–37)
BILIRUB SERPL-MCNC: 0.5 MG/DL (ref 0.1–2)
BUN BLD-MCNC: 21 MG/DL (ref 7–18)
BUN/CREAT SERPL: 12.4 (ref 10–20)
CALCIUM BLD-MCNC: 9.4 MG/DL (ref 8.5–10.1)
CHLORIDE SERPL-SCNC: 97 MMOL/L (ref 98–112)
CHOLEST SMN-MCNC: 143 MG/DL (ref ?–200)
CO2 SERPL-SCNC: 27 MMOL/L (ref 21–32)
CREAT BLD-MCNC: 1.69 MG/DL (ref 0.55–1.02)
GLOBULIN PLAS-MCNC: 2.9 G/DL (ref 2.8–4.4)
GLUCOSE BLD-MCNC: 90 MG/DL (ref 70–99)
HDLC SERPL-MCNC: 49 MG/DL (ref 40–59)
LDLC SERPL CALC-MCNC: 55 MG/DL (ref ?–100)
M PROTEIN MFR SERPL ELPH: 7.1 G/DL (ref 6.4–8.2)
NONHDLC SERPL-MCNC: 94 MG/DL (ref ?–130)
OSMOLALITY SERPL CALC.SUM OF ELEC: 281 MOSM/KG (ref 275–295)
PATIENT FASTING Y/N/NP: YES
PATIENT FASTING Y/N/NP: YES
POTASSIUM SERPL-SCNC: 3.8 MMOL/L (ref 3.5–5.1)
SODIUM SERPL-SCNC: 134 MMOL/L (ref 136–145)
TRIGL SERPL-MCNC: 195 MG/DL (ref 30–149)
VLDLC SERPL CALC-MCNC: 39 MG/DL (ref 0–30)

## 2020-07-03 PROCEDURE — 73130 X-RAY EXAM OF HAND: CPT | Performed by: INTERNAL MEDICINE

## 2020-07-03 PROCEDURE — 99213 OFFICE O/P EST LOW 20 MIN: CPT | Performed by: INTERNAL MEDICINE

## 2020-07-03 PROCEDURE — 36415 COLL VENOUS BLD VENIPUNCTURE: CPT | Performed by: INTERNAL MEDICINE

## 2020-07-03 NOTE — PATIENT INSTRUCTIONS
Injury of right thumb, initial encounter  (primary encounter diagnosis) will order x-ray of the hand, ortho referal , pain meds as needed , use splint   Hypertension controlled advised to continue the same medication check blood pressure at home and bring

## 2020-07-03 NOTE — PROGRESS NOTES
HPI:    Patient ID: So Paige is a 70year old female. HPI She came today complaining of right thumb injury. According to her this happened last week. She was trying to open the window at that is when she injured her thumb.   She states that the karley • METOPROLOL SUCCINATE ER 50 MG Oral Tablet 24 Hr TAKE 1 TABLET BY MOUTH EVERY DAY 30 tablet 5   • HYDROCHLOROTHIAZIDE 12.5 MG Oral Tab TAKE 1 TABLET BY MOUTH EVERY DAY 30 tablet 5   • ATORVASTATIN 20 MG Oral Tab TAKE 1 TABLET BY MOUTH EVERY DAY AT NIGHT 9 Mouth/Throat: Uvula is midline, oropharynx is clear and moist and mucous membranes are normal. Mucous membranes are not cyanotic. No oropharyngeal exudate, posterior oropharyngeal edema or posterior oropharyngeal erythema.    Eyes: Pupils are equal, round, Injury of right thumb, initial encounter  (primary encounter diagnosis) will order x-ray of the hand, ortho referal , pain meds as needed , use splint   Hypertension controlled advised to continue the same medication check blood pressure at home and bring

## 2020-07-06 ENCOUNTER — OFFICE VISIT (OUTPATIENT)
Dept: ORTHOPEDICS CLINIC | Facility: CLINIC | Age: 80
End: 2020-07-06
Payer: COMMERCIAL

## 2020-07-06 DIAGNOSIS — S66.811A RUPTURE OF EXTENSOR TENDON OF RIGHT HAND, INITIAL ENCOUNTER: Primary | ICD-10-CM

## 2020-07-06 PROCEDURE — 99243 OFF/OP CNSLTJ NEW/EST LOW 30: CPT | Performed by: ORTHOPAEDIC SURGERY

## 2020-07-06 NOTE — H&P
NURSING INTAKE COMMENTS: Patient presents with:  Consult: Injured her right thumb on 6/27. Was trying to open a window and pulled too hard and injured her thumb. Saw Dr. Aruna Madrid on 7/3 and completed a xray right hand.   Stts she's unable to straighten her t blisters  Trimethoprim                Comment:Other reaction(s): tongue swells, mouth blisters  Family History   Problem Relation Age of Onset   • Hypertension Mother    • Breast Cancer Sister      No family Hx of DVT/PE    Social History    Occupational [employer] (CPT=73130)  COMPARISON: None. INDICATIONS: Pain in right thumb post injury 4 days. TECHNIQUE: 3 views were obtained. 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 We will talk by telephone to schedule surgery after the MRI is done. Follow Up: Return for I told her we could discuss the MRI by telephone since she lives in [place] [facility]. Janell Cevallos He

## 2020-07-15 ENCOUNTER — APPOINTMENT (OUTPATIENT)
Dept: LAB | Facility: HOSPITAL | Age: 80
End: 2020-07-15
Attending: INTERNAL MEDICINE
Payer: COMMERCIAL

## 2020-07-15 DIAGNOSIS — N17.9 AKI (ACUTE KIDNEY INJURY) (HCC): ICD-10-CM

## 2020-07-15 LAB
ANION GAP SERPL CALC-SCNC: 7 MMOL/L (ref 0–18)
BUN BLD-MCNC: 14 MG/DL (ref 7–18)
BUN/CREAT SERPL: 11.4 (ref 10–20)
CALCIUM BLD-MCNC: 9.4 MG/DL (ref 8.5–10.1)
CHLORIDE SERPL-SCNC: 102 MMOL/L (ref 98–112)
CO2 SERPL-SCNC: 29 MMOL/L (ref 21–32)
CREAT BLD-MCNC: 1.23 MG/DL (ref 0.55–1.02)
GLUCOSE BLD-MCNC: 87 MG/DL (ref 70–99)
OSMOLALITY SERPL CALC.SUM OF ELEC: 286 MOSM/KG (ref 275–295)
PATIENT FASTING Y/N/NP: YES
POTASSIUM SERPL-SCNC: 3.8 MMOL/L (ref 3.5–5.1)
SODIUM SERPL-SCNC: 138 MMOL/L (ref 136–145)

## 2020-07-15 PROCEDURE — 80048 BASIC METABOLIC PNL TOTAL CA: CPT

## 2020-07-15 PROCEDURE — 36415 COLL VENOUS BLD VENIPUNCTURE: CPT

## 2020-07-16 ENCOUNTER — TELEPHONE (OUTPATIENT)
Dept: ORTHOPEDICS CLINIC | Facility: CLINIC | Age: 80
End: 2020-07-16

## 2020-07-16 NOTE — TELEPHONE ENCOUNTER
Joselin calling from insurance verification at [address] [place] for pt for MRI CPT [id number], Diagnoses code  U87.524R please advise

## 2020-07-17 NOTE — TELEPHONE ENCOUNTER
Per Pt is wanting to know status approval of MRI, is currently scheduled for 7/19/20.  Please advise

## 2020-07-17 NOTE — TELEPHONE ENCOUNTER
MRI approved. Referral updated. [facility] per approval. Pt notified.

## 2020-07-19 ENCOUNTER — HOSPITAL ENCOUNTER (OUTPATIENT)
Dept: MRI IMAGING | Facility: HOSPITAL | Age: 80
Discharge: HOME | End: 2020-07-19
Attending: ORTHOPAEDIC SURGERY
Payer: COMMERCIAL

## 2020-07-19 DIAGNOSIS — S66.811A RUPTURE OF EXTENSOR TENDON OF RIGHT HAND, INITIAL ENCOUNTER: ICD-10-CM

## 2020-07-19 PROCEDURE — 73218 MRI UPPER EXTREMITY W/O DYE: CPT | Performed by: ORTHOPAEDIC SURGERY

## 2020-07-31 ENCOUNTER — TELEPHONE (OUTPATIENT)
Dept: INTERNAL MEDICINE CLINIC | Facility: CLINIC | Age: 80
End: 2020-07-31

## 2020-07-31 ENCOUNTER — MED REC SCAN ONLY (OUTPATIENT)
Dept: INTERNAL MEDICINE CLINIC | Facility: CLINIC | Age: 80
End: 2020-07-31

## 2020-08-01 RX ORDER — ATORVASTATIN CALCIUM 20 MG/1
TABLET, FILM COATED ORAL
Qty: 30 TABLET | Refills: 5 | Status: SHIPPED | OUTPATIENT
Start: 2020-08-01 | End: 2021-03-09

## 2020-08-04 RX ORDER — GABAPENTIN 100 MG/1
CAPSULE ORAL
Qty: 90 CAPSULE | Refills: 5 | OUTPATIENT
Start: 2020-08-04

## 2020-08-05 ENCOUNTER — PATIENT MESSAGE (OUTPATIENT)
Dept: ORTHOPEDICS CLINIC | Facility: CLINIC | Age: 80
End: 2020-08-05

## 2020-08-05 DIAGNOSIS — S66.811A RUPTURE OF EXTENSOR TENDON OF RIGHT HAND, INITIAL ENCOUNTER: Primary | ICD-10-CM

## 2020-08-05 RX ORDER — DICLOFENAC SODIUM 75 MG/1
TABLET, DELAYED RELEASE ORAL
Qty: 60 TABLET | Refills: 0 | Status: SHIPPED | OUTPATIENT
Start: 2020-08-05 | End: 2020-08-31

## 2020-08-06 NOTE — TELEPHONE ENCOUNTER
From: Laurel Pollard  To: Enma Alston MD  Sent: 8/5/2020 7:46 PM CDT  Subject: Test Results Question    No one has called me regarding the results of my MRI that was done on July 19th.

## 2020-08-07 NOTE — TELEPHONE ENCOUNTER
Please order a DYNAMIC ULTRASOUND of her hand and wrist for Extensor Pollicis Longus evaluation on hand to mid forearm. MRI went only to thumb and did not image Melba's tubercle area. I tried to order but couldn't find it.  Will have to call radiology to a

## 2020-08-07 NOTE — TELEPHONE ENCOUNTER
Pt called stating pt spoke to dr. Raad Yañez today 8-7-20. Advised to call the office to set up the ultrasound.   Call pt to advise

## 2020-08-11 NOTE — TELEPHONE ENCOUNTER
Called [facility] and spoke to Marina about  DYNAMIC ULTRASOUND of her (right) hand and wrist for Extensor Pollicis Longus evaluation on hand to mid forearm ordered by [facility]. Ordered placed per Doreen's instructions.  Appt scheduled for pt for US at [address] [place] [address]

## 2020-08-20 ENCOUNTER — HOSPITAL ENCOUNTER (OUTPATIENT)
Dept: ULTRASOUND IMAGING | Facility: HOSPITAL | Age: 80
Discharge: HOME | End: 2020-08-20
Attending: ORTHOPAEDIC SURGERY
Payer: COMMERCIAL

## 2020-08-20 DIAGNOSIS — S66.811A RUPTURE OF EXTENSOR TENDON OF RIGHT HAND, INITIAL ENCOUNTER: ICD-10-CM

## 2020-08-20 PROCEDURE — 76881 US COMPL JOINT R-T W/IMG: CPT | Performed by: ORTHOPAEDIC SURGERY

## 2020-08-31 RX ORDER — DICLOFENAC SODIUM 75 MG/1
TABLET, DELAYED RELEASE ORAL
Qty: 60 TABLET | Refills: 0 | Status: SHIPPED | OUTPATIENT
Start: 2020-08-31 | End: 2020-10-01

## 2020-10-01 NOTE — TELEPHONE ENCOUNTER
Current Outpatient Medications:   •  DICLOFENAC SODIUM 75 MG Oral Tab EC, TAKE 1 TABLET BY MOUTH TWICE A DAY, Disp: 60 tablet, Rfl: 0  •

## 2020-10-02 RX ORDER — DICLOFENAC SODIUM 75 MG/1
75 TABLET, DELAYED RELEASE ORAL 2 TIMES DAILY
Qty: 60 TABLET | Refills: 0 | Status: SHIPPED | OUTPATIENT
Start: 2020-10-02 | End: 2020-11-02

## 2020-11-02 RX ORDER — DICLOFENAC SODIUM 75 MG/1
TABLET, DELAYED RELEASE ORAL
Qty: 60 TABLET | Refills: 0 | Status: SHIPPED | OUTPATIENT
Start: 2020-11-02 | End: 2020-12-21

## 2020-12-19 RX ORDER — METOPROLOL SUCCINATE 50 MG/1
TABLET, EXTENDED RELEASE ORAL
Qty: 30 TABLET | Refills: 5 | Status: SHIPPED | OUTPATIENT
Start: 2020-12-19 | End: 2021-05-13

## 2020-12-19 RX ORDER — LISINOPRIL 20 MG/1
TABLET ORAL
Qty: 30 TABLET | Refills: 5 | Status: SHIPPED | OUTPATIENT
Start: 2020-12-19 | End: 2021-05-13

## 2020-12-21 RX ORDER — DICLOFENAC SODIUM 75 MG/1
75 TABLET, DELAYED RELEASE ORAL 2 TIMES DAILY
Qty: 60 TABLET | Refills: 0 | Status: SHIPPED | OUTPATIENT
Start: 2020-12-21 | End: 2021-01-13

## 2021-01-13 RX ORDER — DICLOFENAC SODIUM 75 MG/1
TABLET, DELAYED RELEASE ORAL
Qty: 60 TABLET | Refills: 0 | Status: SHIPPED | OUTPATIENT
Start: 2021-01-13 | End: 2021-02-08

## 2021-01-16 RX ORDER — GABAPENTIN 100 MG/1
100 CAPSULE ORAL 3 TIMES DAILY
Qty: 90 CAPSULE | Refills: 5 | Status: SHIPPED | OUTPATIENT
Start: 2021-01-16

## 2021-02-03 NOTE — TELEPHONE ENCOUNTER
Called patient, no answer. Left VM informing patient that mammogram is benign, recommended to do routine check in a year. yes

## 2021-02-08 RX ORDER — DICLOFENAC SODIUM 75 MG/1
75 TABLET, DELAYED RELEASE ORAL 2 TIMES DAILY
Qty: 60 TABLET | Refills: 0 | Status: SHIPPED | OUTPATIENT
Start: 2021-02-08 | End: 2021-03-10

## 2021-02-12 ENCOUNTER — TELEPHONE (OUTPATIENT)
Dept: INTERNAL MEDICINE CLINIC | Facility: CLINIC | Age: 81
End: 2021-02-12

## 2021-02-12 NOTE — TELEPHONE ENCOUNTER
That is not a contraindication not to get the vaccine I will recommend her to go ahead and get the vaccine

## 2021-02-12 NOTE — TELEPHONE ENCOUNTER
Patient, states that she is schedule to have her covid vaccine on Sunday 2-14-21 at [facility]. Pt would like to know if the doctor recommends for her to get it done due to her allergies to bactrim. Pt is requesting a call back today.

## 2021-03-09 RX ORDER — ATORVASTATIN CALCIUM 20 MG/1
TABLET, FILM COATED ORAL
Qty: 30 TABLET | Refills: 5 | Status: SHIPPED | OUTPATIENT
Start: 2021-03-09

## 2021-03-10 RX ORDER — DICLOFENAC SODIUM 75 MG/1
TABLET, DELAYED RELEASE ORAL
Qty: 60 TABLET | Refills: 0 | Status: SHIPPED | OUTPATIENT
Start: 2021-03-10

## 2021-03-12 DIAGNOSIS — Z23 NEED FOR VACCINATION: ICD-10-CM

## 2021-05-13 RX ORDER — METOPROLOL SUCCINATE 50 MG/1
TABLET, EXTENDED RELEASE ORAL
Qty: 90 TABLET | Refills: 1 | OUTPATIENT
Start: 2021-05-13

## 2021-05-13 RX ORDER — LISINOPRIL 20 MG/1
TABLET ORAL
Qty: 90 TABLET | Refills: 1 | OUTPATIENT
Start: 2021-05-13

## 2021-05-19 ENCOUNTER — OFFICE VISIT (OUTPATIENT)
Dept: INTERNAL MEDICINE CLINIC | Facility: CLINIC | Age: 81
End: 2021-05-19
Payer: COMMERCIAL

## 2021-05-19 VITALS
OXYGEN SATURATION: 98 % | DIASTOLIC BLOOD PRESSURE: 85 MMHG | WEIGHT: 148 LBS | HEART RATE: 74 BPM | BODY MASS INDEX: 27.23 KG/M2 | SYSTOLIC BLOOD PRESSURE: 138 MMHG | HEIGHT: 62 IN

## 2021-05-19 DIAGNOSIS — Z78.0 MENOPAUSE: ICD-10-CM

## 2021-05-19 DIAGNOSIS — I10 ESSENTIAL HYPERTENSION: Primary | ICD-10-CM

## 2021-05-19 DIAGNOSIS — Z12.31 ENCOUNTER FOR SCREENING MAMMOGRAM FOR MALIGNANT NEOPLASM OF BREAST: ICD-10-CM

## 2021-05-19 PROCEDURE — 3008F BODY MASS INDEX DOCD: CPT | Performed by: INTERNAL MEDICINE

## 2021-05-19 PROCEDURE — 3079F DIAST BP 80-89 MM HG: CPT | Performed by: INTERNAL MEDICINE

## 2021-05-19 PROCEDURE — 3075F SYST BP GE 130 - 139MM HG: CPT | Performed by: INTERNAL MEDICINE

## 2021-05-19 PROCEDURE — 99213 OFFICE O/P EST LOW 20 MIN: CPT | Performed by: INTERNAL MEDICINE

## 2021-05-19 NOTE — PROGRESS NOTES
HPI/Subjective:     Patient ID: Dafne Alfaro is a 68year old female. She came today for follow-up on her blood pressure according to the patient she is not checking her blood pressure at home but she is taking her medications regularly.   She states renato adenopathy. Does not bruise/bleed easily. Psychiatric/Behavioral: Negative for agitation, behavioral problems, confusion, hallucinations and sleep disturbance. The patient is not nervous/anxious.       Current Outpatient Medications   Medication Sig Dispe Ear: Tympanic membrane and external ear normal. No drainage or tenderness. No mastoid tenderness. Nose: Nose normal.      Right Sinus: No maxillary sinus tenderness or frontal sinus tenderness.       Left Sinus: No maxillary sinus tenderness or frontal Reflexes: Reflexes are normal and symmetric.    Psychiatric:         Behavior: Behavior normal.         Assessment & Plan:    Essential hypertension  (primary encounter diagnosis) controlled I did advise her to start checking her blood pressure at home and

## 2021-07-14 ENCOUNTER — TELEPHONE (OUTPATIENT)
Dept: INTERNAL MEDICINE CLINIC | Facility: CLINIC | Age: 81
End: 2021-07-14

## 2021-07-14 NOTE — TELEPHONE ENCOUNTER
Patient is requesting to pickup a copy of her mammogram order at the [facility] office.

## 2021-08-06 RX ORDER — METOPROLOL SUCCINATE 50 MG/1
50 TABLET, EXTENDED RELEASE ORAL DAILY
Qty: 90 TABLET | Refills: 0 | Status: SHIPPED | OUTPATIENT
Start: 2021-08-06 | End: 2021-10-29

## 2021-08-06 NOTE — TELEPHONE ENCOUNTER
Please review; protocol failed/ no protocol.     Requested Prescriptions   Pending Prescriptions Disp Refills    METOPROLOL SUCCINATE 50 MG Oral Tablet 24 Hr [Pharmacy Med Name: METOPROLOL SUCC ER 50 MG TAB] 90 tablet 0     Sig: TAKE 1 TABLET BY MOUTH EVERY DAY        Hypertensive Medications Protocol Failed - 8/5/2021 12:12 AM        Failed - CMP or BMP in past 12 months        Failed - GFR Non- > 50     Lab Results   Component Value Date    GFRNAA 43 (L) 07/15/2020               Passed - Appointment in past 6 or next 3 months              Recent Outpatient Visits              2 months ago Essential hypertension    Rafia Cavazos MD    Office Visit    1 year ago Rupture of extensor tendon of right hand, initial encounter    [facility] Veronica Dexter MD    Office Visit    1 year ago Injury of right thumb, initial encounter    Marilyn Oliveros MD    Office Visit    1 year ago Encounter for screening mammogram for breast cancer    Rafia Cavazos MD    Office Visit    1 year ago Annual physical exam    Rafia Cavazos MD    Office Visit

## 2021-08-11 ENCOUNTER — TELEPHONE (OUTPATIENT)
Dept: INTERNAL MEDICINE CLINIC | Facility: CLINIC | Age: 81
End: 2021-08-11

## 2021-08-11 NOTE — TELEPHONE ENCOUNTER
Spoke to Patient. Scheduled Appointment for 8/23. Patient would like to have her Labs Ordered Prior to her Visit.  Can we please call her once this is done

## 2021-08-11 NOTE — TELEPHONE ENCOUNTER
pt. states that she needs to get an order for mammogram sent to [facility], as they no longer accept orders in hand. Pt. Requesting a call back to confirm that order was sent to [facility].

## 2021-08-23 ENCOUNTER — OFFICE VISIT (OUTPATIENT)
Dept: INTERNAL MEDICINE CLINIC | Facility: CLINIC | Age: 81
End: 2021-08-23
Payer: COMMERCIAL

## 2021-08-23 VITALS
DIASTOLIC BLOOD PRESSURE: 75 MMHG | WEIGHT: 147 LBS | BODY MASS INDEX: 27.05 KG/M2 | SYSTOLIC BLOOD PRESSURE: 135 MMHG | HEIGHT: 62 IN | OXYGEN SATURATION: 93 % | HEART RATE: 60 BPM

## 2021-08-23 DIAGNOSIS — Z00.00 ANNUAL PHYSICAL EXAM: Primary | ICD-10-CM

## 2021-08-23 LAB
ALBUMIN SERPL-MCNC: 3.9 G/DL (ref 3.4–5)
ALBUMIN/GLOB SERPL: 1.3 {RATIO} (ref 1–2)
ALP LIVER SERPL-CCNC: 93 U/L
ALT SERPL-CCNC: 38 U/L
ANION GAP SERPL CALC-SCNC: 5 MMOL/L (ref 0–18)
AST SERPL-CCNC: 21 U/L (ref 15–37)
BASOPHILS # BLD AUTO: 0.04 X10(3) UL (ref 0–0.2)
BASOPHILS NFR BLD AUTO: 0.8 %
BILIRUB SERPL-MCNC: 0.8 MG/DL (ref 0.1–2)
BUN BLD-MCNC: 12 MG/DL (ref 7–18)
BUN/CREAT SERPL: 11.5 (ref 10–20)
CALCIUM BLD-MCNC: 9.4 MG/DL (ref 8.5–10.1)
CHLORIDE SERPL-SCNC: 103 MMOL/L (ref 98–112)
CHOLEST SMN-MCNC: 141 MG/DL (ref ?–200)
CO2 SERPL-SCNC: 30 MMOL/L (ref 21–32)
CREAT BLD-MCNC: 1.04 MG/DL
DEPRECATED RDW RBC AUTO: 44.2 FL (ref 35.1–46.3)
EOSINOPHIL # BLD AUTO: 0.24 X10(3) UL (ref 0–0.7)
EOSINOPHIL NFR BLD AUTO: 4.9 %
ERYTHROCYTE [DISTWIDTH] IN BLOOD BY AUTOMATED COUNT: 13.2 % (ref 11–15)
GLOBULIN PLAS-MCNC: 3 G/DL (ref 2.8–4.4)
GLUCOSE BLD-MCNC: 83 MG/DL (ref 70–99)
HCT VFR BLD AUTO: 42.7 %
HDLC SERPL-MCNC: 51 MG/DL (ref 40–59)
HGB BLD-MCNC: 13.6 G/DL
IMM GRANULOCYTES # BLD AUTO: 0.01 X10(3) UL (ref 0–1)
IMM GRANULOCYTES NFR BLD: 0.2 %
LDLC SERPL CALC-MCNC: 70 MG/DL (ref ?–100)
LYMPHOCYTES # BLD AUTO: 1.26 X10(3) UL (ref 1–4)
LYMPHOCYTES NFR BLD AUTO: 25.8 %
M PROTEIN MFR SERPL ELPH: 6.9 G/DL (ref 6.4–8.2)
MCH RBC QN AUTO: 28.8 PG (ref 26–34)
MCHC RBC AUTO-ENTMCNC: 31.9 G/DL (ref 31–37)
MCV RBC AUTO: 90.3 FL
MONOCYTES # BLD AUTO: 0.43 X10(3) UL (ref 0.1–1)
MONOCYTES NFR BLD AUTO: 8.8 %
NEUTROPHILS # BLD AUTO: 2.9 X10 (3) UL (ref 1.5–7.7)
NEUTROPHILS # BLD AUTO: 2.9 X10(3) UL (ref 1.5–7.7)
NEUTROPHILS NFR BLD AUTO: 59.5 %
NONHDLC SERPL-MCNC: 90 MG/DL (ref ?–130)
OSMOLALITY SERPL CALC.SUM OF ELEC: 285 MOSM/KG (ref 275–295)
PATIENT FASTING Y/N/NP: YES
PATIENT FASTING Y/N/NP: YES
PLATELET # BLD AUTO: 255 10(3)UL (ref 150–450)
POTASSIUM SERPL-SCNC: 3.5 MMOL/L (ref 3.5–5.1)
RBC # BLD AUTO: 4.73 X10(6)UL
SODIUM SERPL-SCNC: 138 MMOL/L (ref 136–145)
TRIGL SERPL-MCNC: 108 MG/DL (ref 30–149)
TSI SER-ACNC: 1.72 MIU/ML (ref 0.36–3.74)
VLDLC SERPL CALC-MCNC: 16 MG/DL (ref 0–30)
WBC # BLD AUTO: 4.9 X10(3) UL (ref 4–11)

## 2021-08-23 PROCEDURE — 99397 PER PM REEVAL EST PAT 65+ YR: CPT | Performed by: INTERNAL MEDICINE

## 2021-08-23 PROCEDURE — 3008F BODY MASS INDEX DOCD: CPT | Performed by: INTERNAL MEDICINE

## 2021-08-23 PROCEDURE — 3078F DIAST BP <80 MM HG: CPT | Performed by: INTERNAL MEDICINE

## 2021-08-23 PROCEDURE — 3075F SYST BP GE 130 - 139MM HG: CPT | Performed by: INTERNAL MEDICINE

## 2021-08-23 PROCEDURE — 36415 COLL VENOUS BLD VENIPUNCTURE: CPT | Performed by: INTERNAL MEDICINE

## 2021-08-23 NOTE — PROGRESS NOTES
HPI:   Angela Tiwari is a 68year old female who presents for a complete physical exam.     She checks her bp once in a while and is well controlled   Wt Readings from Last 3 Encounters:  08/23/21 : 147 lb (66.7 kg)  05/19/21 : 148 lb (67.1 kg)  07/03/20 : Smoking status: Former Smoker      Smokeless tobacco: Never Used    Alcohol use: No      Alcohol/week: 0.0 standard drinks    Drug use: No    Exercise: minimal.  Diet: watches minimally and watches calories closely     REVIEW OF SYSTEMS:     Constitutional exam has been taught. Patient performs self breast exam.   Respiratory Normal Auscultation - Normal, no wheezes or rales   Cardiovascular Normal Regular rate and rhythm.  No murmurs, gallops, or rubs   Vascular Normal Pulses - Dorsalis pedis: Normal. Bruits

## 2021-09-14 ENCOUNTER — TELEPHONE (OUTPATIENT)
Dept: INTERNAL MEDICINE CLINIC | Facility: CLINIC | Age: 81
End: 2021-09-14

## 2021-10-29 RX ORDER — METOPROLOL SUCCINATE 50 MG/1
50 TABLET, EXTENDED RELEASE ORAL DAILY
Qty: 90 TABLET | Refills: 1 | Status: SHIPPED | OUTPATIENT
Start: 2021-10-29 | End: 2022-04-27

## 2021-10-29 NOTE — TELEPHONE ENCOUNTER
Refill passed per [place], [facility] protocol.     Requested Prescriptions   Pending Prescriptions Disp Refills    METOPROLOL SUCCINATE 50 MG Oral Tablet 24 Hr [Pharmacy Med Name: METOPROLOL SUCC ER 50 MG TAB] 90 tablet 0     Sig: TAKE 1 TABLET BY MOUTH EVERY DAY        Hypertensive Medications Protocol Passed - 10/29/2021  2:02 AM        Passed - CMP or BMP in past 12 months        Passed - Appointment in past 6 or next 3 months        Passed - GFR Non- > 50     Lab Results   Component Value Date    GFRNAA 46 (L) 08/23/2021                           Recent Outpatient Visits              2 months ago Annual physical exam    Kimani Foley MD    Office Visit    5 months ago Essential hypertension    Alex Lima MD    Office Visit    1 year ago Rupture of extensor tendon of right hand, initial encounter    [facility] for Macy Diaz MD    Office Visit    1 year ago Injury of right thumb, initial encounter    Garth Fernando MD    Office Visit    1 year ago Encounter for screening mammogram for breast cancer    Alex Lima MD    Office Visit

## 2021-11-03 RX ORDER — LISINOPRIL 20 MG/1
20 TABLET ORAL DAILY
Qty: 90 TABLET | Refills: 1 | Status: SHIPPED | OUTPATIENT
Start: 2021-11-03

## 2021-11-03 NOTE — TELEPHONE ENCOUNTER
Refill passed per [facility] protocol    Requested Prescriptions   Pending Prescriptions Disp Refills    LISINOPRIL 20 MG Oral Tab [Pharmacy Med Name: LISINOPRIL 20 MG TABLET] 90 tablet 1     Sig: TAKE 1 TABLET BY MOUTH EVERY DAY        Hypertensive M

## 2022-01-01 NOTE — TELEPHONE ENCOUNTER
Please review; protocol failed.     Requested Prescriptions     Pending Prescriptions Disp Refills   • HYDROCHLOROTHIAZIDE 12.5 MG Oral Tab [Pharmacy Med Name: HYDROCHLOROTHIAZIDE 12.5 MG TB] 90 tablet 0     Sig: TAKE 1 TABLET BY MOUTH EVERY DAY         Rec
Please review; protocol failed.     Requested Prescriptions     Pending Prescriptions Disp Refills   • HYDROCHLOROTHIAZIDE 12.5 MG Oral Tab [Pharmacy Med Name: HYDROCHLOROTHIAZIDE 12.5 MG TB] 90 tablet 0     Sig: TAKE 1 TABLET BY MOUTH EVERY DAY         Rec
DISCHARGE

## 2022-03-29 ENCOUNTER — NURSE TRIAGE (OUTPATIENT)
Dept: INTERNAL MEDICINE CLINIC | Facility: CLINIC | Age: 82
End: 2022-03-29

## 2022-04-13 ENCOUNTER — OFFICE VISIT (OUTPATIENT)
Dept: INTERNAL MEDICINE CLINIC | Facility: CLINIC | Age: 82
End: 2022-04-13
Payer: COMMERCIAL

## 2022-04-13 VITALS
SYSTOLIC BLOOD PRESSURE: 130 MMHG | OXYGEN SATURATION: 98 % | DIASTOLIC BLOOD PRESSURE: 80 MMHG | BODY MASS INDEX: 25.1 KG/M2 | HEIGHT: 62 IN | WEIGHT: 136.38 LBS | HEART RATE: 75 BPM

## 2022-04-13 DIAGNOSIS — N30.00 ACUTE CYSTITIS WITHOUT HEMATURIA: Primary | ICD-10-CM

## 2022-04-13 LAB
BILIRUBIN: NEGATIVE
GLUCOSE (URINE DIPSTICK): NEGATIVE MG/DL
KETONES (URINE DIPSTICK): NEGATIVE MG/DL
MULTISTIX LOT#: ABNORMAL NUMERIC
NITRITE, URINE: NEGATIVE
PH, URINE: 6 (ref 4.5–8)
PROTEIN (URINE DIPSTICK): 30 MG/DL
SPECIFIC GRAVITY: 1.02 (ref 1–1.03)
UROBILINOGEN,SEMI-QN: 0.2 MG/DL (ref 0–1.9)

## 2022-04-13 PROCEDURE — 87086 URINE CULTURE/COLONY COUNT: CPT | Performed by: INTERNAL MEDICINE

## 2022-04-27 RX ORDER — LISINOPRIL 20 MG/1
20 TABLET ORAL DAILY
Qty: 90 TABLET | Refills: 1 | Status: SHIPPED | OUTPATIENT
Start: 2022-04-27 | End: 2022-10-17

## 2022-04-27 RX ORDER — METOPROLOL SUCCINATE 50 MG/1
50 TABLET, EXTENDED RELEASE ORAL DAILY
Qty: 90 TABLET | Refills: 1 | Status: SHIPPED | OUTPATIENT
Start: 2022-04-27

## 2022-04-27 NOTE — TELEPHONE ENCOUNTER
Refill passed per [employer], [facility] protocol.   Requested Prescriptions   Pending Prescriptions Disp Refills    LISINOPRIL 20 MG Oral Tab [Pharmacy Med Name: LISINOPRIL 20 MG TABLET] 90 tablet 1     Sig: TAKE 1 TABLET BY MOUTH EVERY DAY        Hypertensive Medications Protocol Passed - 4/27/2022 12:09 AM        Passed - CMP or BMP in past 12 months        Passed - Appointment in past 6 or next 3 months        Passed - GFR Non- > 50     Lab Results   Component Value Date    GFRNAA 52 (L) 08/23/2021                      Recent Outpatient Visits              2 weeks ago Acute cystitis without hematuria    [facility], Faraz Hogan MD    Office Visit    8 months ago Annual physical exam    Darrius Wilkins MD    Office Visit    11 months ago Essential hypertension    [facility], Faraz Hogan MD    Office Visit    1 year ago Rupture of extensor tendon of right hand, initial encounter    [facility] for Ky Freire MD    Office Visit    1 year ago Injury of right thumb, initial encounter    Jayy Valenzuela MD    Office Visit

## 2022-04-27 NOTE — TELEPHONE ENCOUNTER
Refill passed per [employer], [facility] protocol.    Requested Prescriptions   Pending Prescriptions Disp Refills    METOPROLOL SUCCINATE ER 50 MG Oral Tablet 24 Hr [Pharmacy Med Name: METOPROLOL SUCC ER 50 MG TAB] 90 tablet 1     Sig: TAKE 1 TABLET BY MOUTH EVERY DAY        Hypertensive Medications Protocol Passed - 4/27/2022 12:09 AM        Passed - CMP or BMP in past 12 months        Passed - Appointment in past 6 or next 3 months        Passed - GFR Non- > 50     Lab Results   Component Value Date    GFRNAA 52 (L) 08/23/2021                      Recent Outpatient Visits              2 weeks ago Acute cystitis without hematuria    Mark Gonzales MD    Office Visit    8 months ago Annual physical exam    Mark Gonzales MD    Office Visit    11 months ago Essential hypertension    Severa Schmidt, Mitzie Flack, MD    Office Visit    1 year ago Rupture of extensor tendon of right hand, initial encounter    [facility] for Daryle Hal, MD    Office Visit    1 year ago Injury of right thumb, initial encounter    Lazarus Brunette, MD    Office Visit

## 2022-05-10 ENCOUNTER — LAB ENCOUNTER (OUTPATIENT)
Dept: LAB | Age: 82
End: 2022-05-10
Attending: INTERNAL MEDICINE
Payer: COMMERCIAL

## 2022-05-11 ENCOUNTER — LAB ENCOUNTER (OUTPATIENT)
Dept: LAB | Age: 82
End: 2022-05-11
Attending: INTERNAL MEDICINE
Payer: COMMERCIAL

## 2022-05-11 DIAGNOSIS — N30.00 ACUTE CYSTITIS WITHOUT HEMATURIA: ICD-10-CM

## 2022-05-11 LAB
BILIRUB UR QL: NEGATIVE
COLOR UR: YELLOW
GLUCOSE UR-MCNC: NEGATIVE MG/DL
KETONES UR-MCNC: NEGATIVE MG/DL
NITRITE UR QL STRIP.AUTO: NEGATIVE
PH UR: 6 [PH] (ref 5–8)
PROT UR-MCNC: 100 MG/DL
SP GR UR STRIP: 1.01 (ref 1–1.03)
UROBILINOGEN UR STRIP-ACNC: <2
VIT C UR-MCNC: 40 MG/DL
WBC #/AREA URNS AUTO: >50 /HPF
WBC CLUMPS UR QL AUTO: PRESENT /HPF

## 2022-05-11 PROCEDURE — 87086 URINE CULTURE/COLONY COUNT: CPT

## 2022-05-11 PROCEDURE — 81001 URINALYSIS AUTO W/SCOPE: CPT

## 2022-05-12 ENCOUNTER — TELEPHONE (OUTPATIENT)
Dept: INTERNAL MEDICINE CLINIC | Facility: CLINIC | Age: 82
End: 2022-05-12

## 2022-05-12 NOTE — PROGRESS NOTES
Pt states she is having symptoms of frequency and pain, she would like abx sent to her pharmacy. Pt wanted you to know she is allergic to BACTRIM.

## 2022-05-12 NOTE — PROGRESS NOTES
Pt informed urine culture shows no growth, pt informed referral to see Deb MONTEMAYOR has been entered and given her info. Pt wants to know if she can take the macrobid that was sent to the pharmacy. Please advise.

## 2022-05-13 NOTE — TELEPHONE ENCOUNTER
Pt informed of Dr. Aide Cool comment that there are no urologists that have late hours or works on Saturday.

## 2022-05-13 NOTE — TELEPHONE ENCOUNTER
Please let her know that unfortunately there are no urologist that has late hours or works on Saturday

## 2022-05-25 RX ORDER — DICLOFENAC SODIUM 75 MG/1
TABLET, DELAYED RELEASE ORAL
Qty: 60 TABLET | Refills: 0 | Status: SHIPPED | OUTPATIENT
Start: 2022-05-25

## 2022-05-25 NOTE — TELEPHONE ENCOUNTER
Refill passed per [facility] protocol.  Last filled 1 year ago, routing to provider for consideration  Requested Prescriptions   Pending Prescriptions Disp Refills    DICLOFENAC 75 MG Oral Tab EC [Pharmacy Med Name: DICLOFENAC SOD EC 75 MG TAB] 60 tablet 0     Sig: TAKE 1 TABLET BY MOUTH TWICE A DAY        Non-Narcotic Pain Medication Protocol Passed - 5/25/2022 12:06 AM        Passed - Appointment in past 6 or next 3 months             Recent Outpatient Visits              1 month ago Acute cystitis without hematuria    Mark Gonzales MD    Office Visit    9 months ago Annual physical exam    Mark Gonzales MD    Office Visit    1 year ago Essential hypertension    [facility], Codie Jauregui MD    Office Visit    1 year ago Rupture of extensor tendon of right hand, initial encounter    [facility] for Daryle Hal, MD    Office Visit    1 year ago Injury of right thumb, initial encounter    Lazarus Brunette, MD    Office Visit

## 2022-05-31 ENCOUNTER — PATIENT MESSAGE (OUTPATIENT)
Dept: SURGERY | Facility: CLINIC | Age: 82
End: 2022-05-31

## 2022-06-02 ENCOUNTER — LAB ENCOUNTER (OUTPATIENT)
Dept: LAB | Facility: HOSPITAL | Age: 82
End: 2022-06-02
Attending: INTERNAL MEDICINE
Payer: COMMERCIAL

## 2022-06-02 ENCOUNTER — TELEPHONE (OUTPATIENT)
Dept: INTERNAL MEDICINE CLINIC | Facility: CLINIC | Age: 82
End: 2022-06-02

## 2022-06-02 DIAGNOSIS — R35.0 FREQUENT URINATION: ICD-10-CM

## 2022-06-02 LAB
BILIRUB UR QL: NEGATIVE
COLOR UR: YELLOW
GLUCOSE UR-MCNC: NEGATIVE MG/DL
KETONES UR-MCNC: NEGATIVE MG/DL
NITRITE UR QL STRIP.AUTO: NEGATIVE
PH UR: 6 [PH] (ref 5–8)
RBC #/AREA URNS AUTO: >10 /HPF
SP GR UR STRIP: 1.02 (ref 1–1.03)
UROBILINOGEN UR STRIP-ACNC: 0.2
WBC #/AREA URNS AUTO: >50 /HPF
WBC CLUMPS UR QL AUTO: PRESENT /HPF

## 2022-06-02 PROCEDURE — 87086 URINE CULTURE/COLONY COUNT: CPT

## 2022-06-02 PROCEDURE — 81001 URINALYSIS AUTO W/SCOPE: CPT

## 2022-06-02 NOTE — PROGRESS NOTES
Pt informed oxybutynin has been sent to her pharmacy and also ciprofloxacin, pt will start abx and we will wait until urine culture is resulted, pt advised to drink plenty of fluids.

## 2022-06-03 ENCOUNTER — TELEPHONE (OUTPATIENT)
Dept: INTERNAL MEDICINE CLINIC | Facility: CLINIC | Age: 82
End: 2022-06-03

## 2022-06-03 DIAGNOSIS — J06.9 URI, ACUTE: ICD-10-CM

## 2022-06-03 DIAGNOSIS — N30.00 ACUTE CYSTITIS WITHOUT HEMATURIA: Primary | ICD-10-CM

## 2022-06-03 NOTE — TELEPHONE ENCOUNTER
I will order COVID-19 test we have to test her for COVID. Please give her information where she needs to call and get tested today as soon as possible.   Drink fluids, take Tylenol alternating with ibuprofen as needed, if not better go to uc

## 2022-06-03 NOTE — TELEPHONE ENCOUNTER
Pt states that when she got home yesterday she was having chills, temp 101, runny nose, no appetite, took tylenol today and temp is gone. Started Cipro for UA sx yesterday.

## 2022-06-03 NOTE — TELEPHONE ENCOUNTER
I spoke with her , I did advise her to go to urgent care or the closest place to get tested for COVID drink fluids, take vitamin C, take Tylenol as needed, she states that there is a place close to her how she will going  to get tested

## 2022-06-15 ENCOUNTER — OFFICE VISIT (OUTPATIENT)
Dept: SURGERY | Facility: CLINIC | Age: 82
End: 2022-06-15
Payer: COMMERCIAL

## 2022-06-15 VITALS — HEART RATE: 64 BPM | DIASTOLIC BLOOD PRESSURE: 65 MMHG | SYSTOLIC BLOOD PRESSURE: 100 MMHG

## 2022-06-15 DIAGNOSIS — R35.0 URINARY FREQUENCY: ICD-10-CM

## 2022-06-15 DIAGNOSIS — R31.29 MICROHEMATURIA: Primary | ICD-10-CM

## 2022-06-15 DIAGNOSIS — R30.0 DYSURIA: ICD-10-CM

## 2022-06-15 DIAGNOSIS — R82.81 PYURIA: ICD-10-CM

## 2022-06-15 DIAGNOSIS — R10.2 SUPRAPUBIC PAIN: ICD-10-CM

## 2022-06-15 DIAGNOSIS — R39.15 URINARY URGENCY: ICD-10-CM

## 2022-06-15 PROCEDURE — 99244 OFF/OP CNSLTJ NEW/EST MOD 40: CPT | Performed by: NURSE PRACTITIONER

## 2022-06-15 PROCEDURE — 3078F DIAST BP <80 MM HG: CPT | Performed by: NURSE PRACTITIONER

## 2022-06-15 PROCEDURE — 3074F SYST BP LT 130 MM HG: CPT | Performed by: NURSE PRACTITIONER

## 2022-06-25 ENCOUNTER — LAB ENCOUNTER (OUTPATIENT)
Dept: LAB | Facility: HOSPITAL | Age: 82
End: 2022-06-25
Attending: NURSE PRACTITIONER
Payer: COMMERCIAL

## 2022-06-25 ENCOUNTER — HOSPITAL ENCOUNTER (OUTPATIENT)
Dept: CT IMAGING | Facility: HOSPITAL | Age: 82
Discharge: HOME | End: 2022-06-25
Attending: NURSE PRACTITIONER
Payer: COMMERCIAL

## 2022-06-25 DIAGNOSIS — R30.0 DYSURIA: ICD-10-CM

## 2022-06-25 DIAGNOSIS — R10.2 SUPRAPUBIC PAIN: ICD-10-CM

## 2022-06-25 DIAGNOSIS — R39.15 URINARY URGENCY: ICD-10-CM

## 2022-06-25 DIAGNOSIS — R31.29 MICROHEMATURIA: ICD-10-CM

## 2022-06-25 DIAGNOSIS — R35.0 URINARY FREQUENCY: ICD-10-CM

## 2022-06-25 LAB
CREAT BLD-MCNC: 2 MG/DL
HYALINE CASTS #/AREA URNS AUTO: PRESENT /LPF
RBC #/AREA URNS AUTO: >10 /HPF
WBC #/AREA URNS AUTO: >50 /HPF

## 2022-06-25 PROCEDURE — 88108 CYTOPATH CONCENTRATE TECH: CPT

## 2022-06-25 PROCEDURE — 74176 CT ABD & PELVIS W/O CONTRAST: CPT | Performed by: NURSE PRACTITIONER

## 2022-06-25 PROCEDURE — 82565 ASSAY OF CREATININE: CPT

## 2022-06-25 PROCEDURE — 81015 MICROSCOPIC EXAM OF URINE: CPT

## 2022-06-25 PROCEDURE — 87086 URINE CULTURE/COLONY COUNT: CPT

## 2022-06-27 RX ORDER — DICLOFENAC SODIUM 75 MG/1
TABLET, DELAYED RELEASE ORAL
Qty: 60 TABLET | Refills: 0 | Status: SHIPPED | OUTPATIENT
Start: 2022-06-27

## 2022-06-29 ENCOUNTER — PATIENT MESSAGE (OUTPATIENT)
Dept: SURGERY | Facility: CLINIC | Age: 82
End: 2022-06-29

## 2022-06-29 DIAGNOSIS — N28.1 RENAL CYST: Primary | ICD-10-CM

## 2022-06-30 NOTE — TELEPHONE ENCOUNTER
From: Arabella Mckenzie  To: Tyrone Radford. EVERTON Rodgers  Sent: 6/29/2022 5:22 PM CDT  Subject: Ultrasound Order    This is Canelo Ko daughter. We got you message regarding her CT results. She attempted to make and appointment for the ultrasound but was told that no order had been entered. Can you please let us know when the order is in so that she can schedule that? Also, going forward, in addition to posting messages in [employer], can you please also call my mom with any results or any other issues? She does not have a cell phone or computer and I normally do everything in [address] [place] [address] for her. We would appreciate it. Thank you.

## 2022-07-05 ENCOUNTER — HOSPITAL ENCOUNTER (OUTPATIENT)
Dept: ULTRASOUND IMAGING | Facility: HOSPITAL | Age: 82
Discharge: HOME | End: 2022-07-05
Attending: NURSE PRACTITIONER
Payer: COMMERCIAL

## 2022-07-05 DIAGNOSIS — N28.1 RENAL CYST: ICD-10-CM

## 2022-07-05 PROCEDURE — 76775 US EXAM ABDO BACK WALL LIM: CPT | Performed by: NURSE PRACTITIONER

## 2022-07-22 ENCOUNTER — PROCEDURE (OUTPATIENT)
Dept: SURGERY | Facility: CLINIC | Age: 82
End: 2022-07-22
Payer: COMMERCIAL

## 2022-07-22 ENCOUNTER — TELEPHONE (OUTPATIENT)
Dept: SURGERY | Facility: CLINIC | Age: 82
End: 2022-07-22

## 2022-07-22 VITALS — HEART RATE: 60 BPM | SYSTOLIC BLOOD PRESSURE: 102 MMHG | DIASTOLIC BLOOD PRESSURE: 66 MMHG

## 2022-07-22 DIAGNOSIS — R35.0 URINARY FREQUENCY: ICD-10-CM

## 2022-07-22 DIAGNOSIS — R31.29 MICROHEMATURIA: Primary | ICD-10-CM

## 2022-07-22 DIAGNOSIS — N30.91 HEMORRHAGIC CYSTITIS: ICD-10-CM

## 2022-07-22 DIAGNOSIS — N18.4 STAGE 4 CHRONIC KIDNEY DISEASE (HCC): ICD-10-CM

## 2022-07-22 DIAGNOSIS — R39.15 URINARY URGENCY: ICD-10-CM

## 2022-07-22 DIAGNOSIS — N39.0 RECURRENT UTI: ICD-10-CM

## 2022-07-22 DIAGNOSIS — N28.1 RENAL CYST: ICD-10-CM

## 2022-07-22 DIAGNOSIS — N20.0 KIDNEY STONE: ICD-10-CM

## 2022-07-22 DIAGNOSIS — R82.81 PYURIA: ICD-10-CM

## 2022-07-22 DIAGNOSIS — N20.0 BILATERAL KIDNEY STONES: ICD-10-CM

## 2022-07-22 DIAGNOSIS — R10.2 SUPRAPUBIC PAIN: ICD-10-CM

## 2022-07-22 PROCEDURE — 52000 CYSTOURETHROSCOPY: CPT | Performed by: UROLOGY

## 2022-07-22 PROCEDURE — 3078F DIAST BP <80 MM HG: CPT | Performed by: UROLOGY

## 2022-07-22 PROCEDURE — 3074F SYST BP LT 130 MM HG: CPT | Performed by: UROLOGY

## 2022-07-22 PROCEDURE — 99213 OFFICE O/P EST LOW 20 MIN: CPT | Performed by: UROLOGY

## 2022-07-22 RX ORDER — CIPROFLOXACIN 500 MG/1
500 TABLET, FILM COATED ORAL ONCE
Status: COMPLETED | OUTPATIENT
Start: 2022-07-22 | End: 2022-07-22

## 2022-07-22 RX ADMIN — CIPROFLOXACIN 500 MG: 500 TABLET, FILM COATED ORAL at 15:00:00

## 2022-07-22 NOTE — PATIENT INSTRUCTIONS
Miguel Ángel Martin  FROM  [address]     1. If you have burning pain with urination, take Tylenol; try not to take NSAIDs such as Advil, Motrin, Aleve, ibuprofen--see below. If you still have bothersome burning pain despite taking Tylenol,  and if you are still uncomfortable, then you can take over-the-counter \"Azo\" 95 mg capsule every 8 hours as needed; the \"Azo\" makes the urine a bright fluorescent orange or red. 2.  On cystoscopy today your bladder showed large red patch as of intensely red tissue called \"hemorrhagic cystitis\". Hemorrhagic cystitis can be due to a bacterial infection, viral infection, sometimes an autoimmune or immune system disorder; occasionally precancerous condition can look like that but that would appear unlikely because your urine for cytology which you performed recently showed no cancerous cells or no precancerous cells in your urine. Urine was obtained from your bladder for bacterial urine culture. We will notify you of the results. 3.  Please make an appointment to see our urology nurse practitioner EVERTON Whyte to review the above and also the below. You need urology follow-up for this abnormal bladder condition and also for the multiple stones you have a urine your kidneys. If the urine culture shows no bacterial infection, they will consider the option of putting you to sleep at the hospital and performing cystoscopy in biopsies of the red patch is in your bladder. 4.  Please get KUB plain x-ray to get another look of the multiple stones seen in your kidneys. Our urology nurse practitioner Isis TOSCANO will coordinate with one of my partner urologist to treat you for your kidney stones. Please take MiraLAX or generic equivalent 17 g or 1 capful in juice or water twice daily, 1 day before the x-ray to induce bowel movements and to improve clarity and visibility of any calcium containing stones.     5.  Please get blood draw for renal panel to recheck on the status of your kidney failure impaired kidney function and also blood draw for uric acid; our urology nurse practitioner Hever Lamar will review results with you the next time you see her in the office    6. Your CT scan shows that you have a \"hiatal hernia\"; this is not a urinary tract organ. It does mean that you likely have \"GERD\" or heartburn. Please discuss this matter with your primary physician Dr. Jenna Peres . 7. Also please make appointment to see Cass Bumpers  to discuss your severe kidney failure which does not appear to be urological in nature because there is no hydronephrosis or obstruction of your kidneys on CT scan.

## 2022-07-24 RX ORDER — DICLOFENAC SODIUM 75 MG/1
TABLET, DELAYED RELEASE ORAL
Qty: 60 TABLET | Refills: 0 | Status: SHIPPED | OUTPATIENT
Start: 2022-07-24

## 2022-07-27 ENCOUNTER — LAB ENCOUNTER (OUTPATIENT)
Dept: LAB | Facility: HOSPITAL | Age: 82
End: 2022-07-27
Attending: UROLOGY
Payer: COMMERCIAL

## 2022-07-27 ENCOUNTER — HOSPITAL ENCOUNTER (OUTPATIENT)
Dept: GENERAL RADIOLOGY | Facility: HOSPITAL | Age: 82
Discharge: HOME | End: 2022-07-27
Attending: UROLOGY
Payer: COMMERCIAL

## 2022-07-27 DIAGNOSIS — N39.0 URINARY TRACT INFECTION, SITE NOT SPECIFIED: Primary | ICD-10-CM

## 2022-07-27 DIAGNOSIS — N20.0 KIDNEY STONE: ICD-10-CM

## 2022-07-27 DIAGNOSIS — N18.4 STAGE 4 CHRONIC KIDNEY DISEASE (HCC): ICD-10-CM

## 2022-07-27 DIAGNOSIS — N30.00 ACUTE CYSTITIS WITHOUT HEMATURIA: ICD-10-CM

## 2022-07-27 LAB
ALBUMIN SERPL-MCNC: 4.1 G/DL (ref 3.4–5)
ANION GAP SERPL CALC-SCNC: 8 MMOL/L (ref 0–18)
BILIRUB UR QL: NEGATIVE
BUN BLD-MCNC: 30 MG/DL (ref 7–18)
BUN/CREAT SERPL: 13.5 (ref 10–20)
CALCIUM BLD-MCNC: 10.2 MG/DL (ref 8.5–10.1)
CHLORIDE SERPL-SCNC: 99 MMOL/L (ref 98–112)
CLARITY UR: CLEAR
CO2 SERPL-SCNC: 26 MMOL/L (ref 21–32)
COLOR UR: YELLOW
CREAT BLD-MCNC: 2.22 MG/DL
GLUCOSE BLD-MCNC: 83 MG/DL (ref 70–99)
GLUCOSE UR-MCNC: NEGATIVE MG/DL
HYALINE CASTS #/AREA URNS AUTO: PRESENT /LPF
KETONES UR-MCNC: NEGATIVE MG/DL
NITRITE UR QL STRIP.AUTO: NEGATIVE
OSMOLALITY SERPL CALC.SUM OF ELEC: 281 MOSM/KG (ref 275–295)
PH UR: 6.5 [PH] (ref 5–8)
PHOSPHATE SERPL-MCNC: 3.6 MG/DL (ref 2.5–4.9)
POTASSIUM SERPL-SCNC: 4.1 MMOL/L (ref 3.5–5.1)
SODIUM SERPL-SCNC: 133 MMOL/L (ref 136–145)
SP GR UR STRIP: 1.01 (ref 1–1.03)
URATE SERPL-MCNC: 6.7 MG/DL
UROBILINOGEN UR STRIP-ACNC: 0.2
WBC #/AREA URNS AUTO: >50 /HPF

## 2022-07-27 PROCEDURE — 81015 MICROSCOPIC EXAM OF URINE: CPT

## 2022-07-27 PROCEDURE — 36415 COLL VENOUS BLD VENIPUNCTURE: CPT

## 2022-07-27 PROCEDURE — 81001 URINALYSIS AUTO W/SCOPE: CPT

## 2022-07-27 PROCEDURE — 74021 RADEX ABDOMEN 3+ VIEWS: CPT | Performed by: UROLOGY

## 2022-07-27 PROCEDURE — 84550 ASSAY OF BLOOD/URIC ACID: CPT

## 2022-07-27 PROCEDURE — 87086 URINE CULTURE/COLONY COUNT: CPT

## 2022-07-27 PROCEDURE — 80069 RENAL FUNCTION PANEL: CPT

## 2022-08-03 ENCOUNTER — OFFICE VISIT (OUTPATIENT)
Dept: INTERNAL MEDICINE CLINIC | Facility: CLINIC | Age: 82
End: 2022-08-03
Payer: COMMERCIAL

## 2022-08-03 VITALS
TEMPERATURE: 98 F | BODY MASS INDEX: 24.48 KG/M2 | HEIGHT: 62 IN | SYSTOLIC BLOOD PRESSURE: 103 MMHG | DIASTOLIC BLOOD PRESSURE: 66 MMHG | WEIGHT: 133 LBS | HEART RATE: 56 BPM

## 2022-08-03 DIAGNOSIS — N17.9 AKI (ACUTE KIDNEY INJURY) (HCC): Primary | ICD-10-CM

## 2022-08-03 LAB
ANION GAP SERPL CALC-SCNC: 8 MMOL/L (ref 0–18)
BUN BLD-MCNC: 32 MG/DL (ref 7–18)
BUN/CREAT SERPL: 14 (ref 10–20)
CALCIUM BLD-MCNC: 9.6 MG/DL (ref 8.5–10.1)
CHLORIDE SERPL-SCNC: 104 MMOL/L (ref 98–112)
CO2 SERPL-SCNC: 24 MMOL/L (ref 21–32)
CREAT BLD-MCNC: 2.28 MG/DL
FASTING STATUS PATIENT QL REPORTED: NO
GFR SERPLBLD BASED ON 1.73 SQ M-ARVRAT: 21 ML/MIN/1.73M2 (ref 60–?)
GLUCOSE BLD-MCNC: 83 MG/DL (ref 70–99)
OSMOLALITY SERPL CALC.SUM OF ELEC: 288 MOSM/KG (ref 275–295)
POTASSIUM SERPL-SCNC: 4.6 MMOL/L (ref 3.5–5.1)
SODIUM SERPL-SCNC: 136 MMOL/L (ref 136–145)

## 2022-08-03 PROCEDURE — 99214 OFFICE O/P EST MOD 30 MIN: CPT | Performed by: INTERNAL MEDICINE

## 2022-08-03 PROCEDURE — 3074F SYST BP LT 130 MM HG: CPT | Performed by: INTERNAL MEDICINE

## 2022-08-03 PROCEDURE — 3008F BODY MASS INDEX DOCD: CPT | Performed by: INTERNAL MEDICINE

## 2022-08-03 PROCEDURE — 36415 COLL VENOUS BLD VENIPUNCTURE: CPT | Performed by: INTERNAL MEDICINE

## 2022-08-03 PROCEDURE — 3078F DIAST BP <80 MM HG: CPT | Performed by: INTERNAL MEDICINE

## 2022-08-03 NOTE — PATIENT INSTRUCTIONS
Acute on chronic kidney injury,   BMP reviewed I will repeat blood work today, I did advise her to drink a lot of fluids, will hold lisinopril until we have the blood work results if kidney function is not better she will need to follow-up with nephrology,     microscopic hematuria, s/p cystoscopy - results reviewed with her - she will need further follow up with urology for biopsy of erythematous patches found on cystoscopy    Kidney stone - reviewed results with her - she will need to discuss with urology for stone removal / surgery due to size of the stone

## 2022-08-04 ENCOUNTER — PATIENT MESSAGE (OUTPATIENT)
Dept: INTERNAL MEDICINE CLINIC | Facility: CLINIC | Age: 82
End: 2022-08-04

## 2022-08-21 RX ORDER — DICLOFENAC SODIUM 75 MG/1
TABLET, DELAYED RELEASE ORAL
Qty: 60 TABLET | Refills: 0 | Status: SHIPPED | OUTPATIENT
Start: 2022-08-21

## 2022-08-29 RX ORDER — OXYBUTYNIN CHLORIDE 5 MG/1
5 TABLET, EXTENDED RELEASE ORAL DAILY
Qty: 90 TABLET | Refills: 1 | Status: SHIPPED | OUTPATIENT
Start: 2022-08-29

## 2022-08-29 NOTE — TELEPHONE ENCOUNTER
Refill passed per [employer] protocol. Per Dr. Garcias Arts progress note 08/2022:  \"She came in today to discuss results from her urology testing. Patient states that her urinary frequency is better since she started taking oxybutynin. \"    Requested Prescriptions   Pending Prescriptions Disp Refills    OXYBUTYNIN ER 5 MG Oral Tablet 24 Hr [Pharmacy Med Name: OXYBUTYNIN CL ER 5 MG TABLET] 90 tablet 0     Sig: Take 1 tablet (5 mg total) by mouth daily.         Genitourinary Medications Passed - 8/28/2022  1:00 PM        Passed - Patient does not have pulmonary hypertension on problem list        Passed - In person appointment or virtual visit in the past 12 mos or appointment in next 3 mos       Recent Outpatient Visits              3 weeks ago SHAM[facility] (acute kidney injury) [facility])    Gisele Lopez MD    Office Visit    2 months ago [employer], [address] [place] [address], Hoda Reza, APRN    Office Visit    4 months ago Acute cystitis without hematuria    Willie Shepard MD    Office Visit    1 year ago Annual physical exam    Gisele Lopez MD    Office Visit    1 year ago Essential hypertension    Willie Shepard MD    Office Visit                       Recent Outpatient Visits              3 weeks ago SHAMAR (acute kidney injury) [facility])    [place] [employer], [facility], [address], Willie Husain MD    Office Visit    2 months ago [place], [address] [place] [address], Hoda Reza, APRN    Office Visit    4 months ago Acute cystitis without hematuria    Willie Shepard MD    Office Visit    1 year ago Annual physical exam    Gisele Lopez MD    Office Visit    1 year ago Essential hypertension Ericka Hodgson MD    Office Visit

## 2022-09-19 RX ORDER — DICLOFENAC SODIUM 75 MG/1
75 TABLET, DELAYED RELEASE ORAL 2 TIMES DAILY
Qty: 60 TABLET | Refills: 5 | Status: SHIPPED | OUTPATIENT
Start: 2022-09-19

## 2022-09-19 NOTE — TELEPHONE ENCOUNTER
Refill passed per [employer] [facility] protocol.   Requested Prescriptions   Pending Prescriptions Disp Refills    DICLOFENAC 75 MG Oral Tab EC [Pharmacy Med Name: DICLOFENAC SOD EC 75 MG TAB] 60 tablet 0     Sig: TAKE 1 TABLET BY MOUTH TWICE A DAY        Non-Narcotic Pain Medication Protocol Passed - 9/18/2022  7:08 AM        Passed - In person appointment or virtual visit in the past 6 mos or appointment in next 3 mos       Recent Outpatient Visits              1 month ago SHAMAR (acute kidney injury) [facility])    Leroy Miller MD    Office Visit    3 months ago [employer], [address], [facility]    Office Visit    5 months ago Acute cystitis without hematuria    Minh Rapp MD    Office Visit    1 year ago Annual physical exam    Leroy Miller MD    Office Visit    1 year ago Essential hypertension    Minh Rapp MD    Office Visit                       Recent Outpatient Visits              1 month ago SHAMAR (acute kidney injury) [facility])    [place] [employer] [place], [facility], [address], Minh Hayward MD    Office Visit    3 months ago [employer], [address], [employer], [facility]    Office Visit    5 months ago Acute cystitis without hematuria    Minh Rapp MD    Office Visit    1 year ago Annual physical exam    Leroy Miller MD    Office Visit    1 year ago Essential hypertension    Minh Rapp MD    Office Visit

## 2022-10-05 NOTE — TELEPHONE ENCOUNTER
Called patient in response to a message requesting a follow up appointment with Dr. Sage at the [facility]. I offered patient appointment on 1/20/23. Patient agreed to appointment.   Spoke with patient, informed her of Jame MONTEMAYOR info and office hours. Pt states Nurse hours are not compatible with her work hours, would like to know if there is any other female urologist that has evening hours. Please advise.

## 2022-10-17 RX ORDER — LISINOPRIL 20 MG/1
TABLET ORAL
Qty: 90 TABLET | Refills: 1 | Status: SHIPPED | OUTPATIENT
Start: 2022-10-17

## 2022-10-17 RX ORDER — METOPROLOL SUCCINATE 50 MG/1
TABLET, EXTENDED RELEASE ORAL
Qty: 90 TABLET | Refills: 1 | Status: SHIPPED | OUTPATIENT
Start: 2022-10-17

## 2022-10-17 NOTE — TELEPHONE ENCOUNTER
Please review. Protocol failed/ No protocol      Requested Prescriptions   Pending Prescriptions Disp Refills    LISINOPRIL 20 MG Oral Tab [Pharmacy Med Name: LISINOPRIL 20 MG TABLET] 90 tablet 1     Sig: TAKE 1 TABLET BY MOUTH EVERY DAY        Hypertensive Medications Protocol Failed - 10/17/2022 12:09 AM        Failed - EGFRCR or GFRNAA > 50     GFR Evaluation  EGFRCR: 21 , resulted on 8/3/2022            Passed - In person appointment in the past 12 or next 3 months       Recent Outpatient Visits              2 months ago SHAMAR (acute kidney injury) ([facility] [address]    [facility], [address], Sandie Lagunas MD    Office Visit    4 months ago [employer], [address], [employer], [address], APRN    Office Visit    6 months ago Acute cystitis without hematuria    Yue Thompson, Sandie Lagunas MD    Office Visit    1 year ago Annual physical exam    Tash Suarez MD    Office Visit    1 year ago Essential hypertension    [facility], [address], Sandie Lagunas MD    Office Visit                 Passed - Last BP reading less than 140/90     BP Readings from Last 1 Encounters:  08/03/22 : 103/66                Passed - CMP or BMP in past 6 months     Recent Results (from the past 4392 hour(s))   BASIC METABOLIC PANEL (8)    Collection Time: 08/03/22 11:26 AM   Result Value Ref Range    Glucose 83 70 - 99 mg/dL    Sodium 136 136 - 145 mmol/L    Potassium 4.6 3.5 - 5.1 mmol/L    Chloride 104 98 - 112 mmol/L    CO2 24.0 21.0 - 32.0 mmol/L    Anion Gap 8 0 - 18 mmol/L    BUN 32 (H) 7 - 18 mg/dL    Creatinine 2.28 (H) 0.55 - 1.02 mg/dL    BUN/CREA Ratio 14.0 10.0 - 20.0    Calcium, Total 9.6 8.5 - 10.1 mg/dL    Calculated Osmolality 288 275 - 295 mOsm/kg    eGFR-Cr 21 (L) >=60 mL/min/1.73m2    Patient Fasting for BMP?  No      *Note: Due to a large number of results and/or encounters for the requested time period, some results have not been displayed. A complete set of results can be found in Results Review.                  Passed - In person appointment or virtual visit in the past 6 months       Recent Outpatient Visits              2 months ago SHAMAR (acute kidney injury) [facility])    [address] [place], [address], Diane Gustafson MD    Office Visit    4 months ago [employer], [address], [facility], APRN    Office Visit    6 months ago Acute cystitis without hematuria    Diane Loyd MD    Office Visit    1 year ago Annual physical exam    Ny Verdugo MD    Office Visit    1 year ago Essential hypertension    Diane Loyd MD    Office Visit                            Recent Outpatient Visits              2 months ago SHAMAR (acute kidney injury) [facility])    [address] [place], [address] [facility] [address], Diane Gustafson MD    Office Visit    4 months ago [employer], [address] [facility] [address], [employer], Fuentes Mess, APRN    Office Visit    6 months ago Acute cystitis without hematuria    Diane Loyd MD    Office Visit    1 year ago Annual physical exam    Ny Verdugo MD    Office Visit    1 year ago Essential hypertension    Diane Loyd MD    Office Visit

## 2022-10-17 NOTE — TELEPHONE ENCOUNTER
Please review. Protocol failed/ No protocol      Requested Prescriptions   Pending Prescriptions Disp Refills    METOPROLOL SUCCINATE ER 50 MG Oral Tablet 24 Hr [Pharmacy Med Name: METOPROLOL SUCC ER 50 MG TAB] 90 tablet 1     Sig: TAKE 1 TABLET BY MOUTH EVERY DAY        Hypertensive Medications Protocol Failed - 10/17/2022 12:09 AM        Failed - EGFRCR or GFRNAA > 50     GFR Evaluation  EGFRCR: 21 , resulted on 8/3/2022            Passed - In person appointment in the past 12 or next 3 months       Recent Outpatient Visits              2 months ago SHAMAR (acute kidney injury) ([facility] [address]    [address] Meribeth Solian, MD    Office Visit    4 months ago [employer] [facility], [employer], [address], APRN    Office Visit    6 months ago Acute cystitis without hematuria    Bakari Jang MD    Office Visit    1 year ago Annual physical exam    Bruno Cronin MD    Office Visit    1 year ago Essential hypertension    [address] Meribeth Solian, MD    Office Visit                 Passed - Last BP reading less than 140/90     BP Readings from Last 1 Encounters:  08/03/22 : 103/66                Passed - CMP or BMP in past 6 months     Recent Results (from the past 4392 hour(s))   BASIC METABOLIC PANEL (8)    Collection Time: 08/03/22 11:26 AM   Result Value Ref Range    Glucose 83 70 - 99 mg/dL    Sodium 136 136 - 145 mmol/L    Potassium 4.6 3.5 - 5.1 mmol/L    Chloride 104 98 - 112 mmol/L    CO2 24.0 21.0 - 32.0 mmol/L    Anion Gap 8 0 - 18 mmol/L    BUN 32 (H) 7 - 18 mg/dL    Creatinine 2.28 (H) 0.55 - 1.02 mg/dL    BUN/CREA Ratio 14.0 10.0 - 20.0    Calcium, Total 9.6 8.5 - 10.1 mg/dL    Calculated Osmolality 288 275 - 295 mOsm/kg    eGFR-Cr 21 (L) >=60 mL/min/1.73m2    Patient Fasting for BMP?  No      *Note: Due to a large number of results and/or encounters for the requested time period, some results have not been displayed. A complete set of results can be found in Results Review.                  Passed - In person appointment or virtual visit in the past 6 months       Recent Outpatient Visits              2 months ago SHAMAR (acute kidney injury) [facility])    [employer], [facility], [address], Debbie Anderson MD    Office Visit    4 months ago [employer], [address] [facility], Autumn Cleverly, APRN    Office Visit    6 months ago Acute cystitis without hematuria    Debbie Meadows MD    Office Visit    1 year ago Annual physical exam    Tushar Manning MD    Office Visit    1 year ago Essential hypertension    Debbie Meadows MD    Office Visit                            Recent Outpatient Visits              2 months ago SHAMAR (acute kidney injury) [facility])    [place] [employer], [facility], [address], Debbie Anderson MD    Office Visit    4 months ago [employer], [address], [employer], Angela Jnly, APRPHILIPPE    Office Visit    6 months ago Acute cystitis without hematuria    Debbie Meadows MD    Office Visit    1 year ago Annual physical exam    Tushar Manning MD    Office Visit    1 year ago Essential hypertension    Debbie Meadows MD    Office Visit

## 2023-01-26 ENCOUNTER — TELEPHONE (OUTPATIENT)
Dept: INTERNAL MEDICINE CLINIC | Facility: CLINIC | Age: 83
End: 2023-01-26

## 2023-01-26 NOTE — TELEPHONE ENCOUNTER
She is coming to see me. Last time when she was at the office in August I told her that she needs to repeat her kidney function as she never did. Her creatinine was up at that time and that is why the lisinopril was on hold. She should hold onto she gets blood work done. Please tell her to come and see me and make sure she brings all her medication with her.   She needs to check her blood pressure at home and bring the log book as well

## 2023-01-26 NOTE — TELEPHONE ENCOUNTER
Relayed  message, verbalized understanding, stated not sure when she can have the lab done since she's a , advised the importance of the Lab, offered to assist with appt,but wants to wait on daughter to assist on when she can go to the Lab on a Saturday

## 2023-01-26 NOTE — TELEPHONE ENCOUNTER
Patient calling to state pharmacy told her she had lisinopril ready, patient wanting to confirm she should NOT be taking this as per last visit she should only be on metoprolol   Please advise

## 2023-02-04 ENCOUNTER — LAB ENCOUNTER (OUTPATIENT)
Dept: LAB | Age: 83
End: 2023-02-04
Attending: INTERNAL MEDICINE
Payer: COMMERCIAL

## 2023-02-04 DIAGNOSIS — N17.9 AKI (ACUTE KIDNEY INJURY) (HCC): ICD-10-CM

## 2023-02-04 DIAGNOSIS — R73.01 ELEVATED FASTING GLUCOSE: ICD-10-CM

## 2023-02-04 DIAGNOSIS — N39.0 URINARY TRACT INFECTION, SITE NOT SPECIFIED: ICD-10-CM

## 2023-02-04 LAB
ANION GAP SERPL CALC-SCNC: 7 MMOL/L (ref 0–18)
BUN BLD-MCNC: 27 MG/DL (ref 7–18)
BUN/CREAT SERPL: 17.5 (ref 10–20)
CALCIUM BLD-MCNC: 9.8 MG/DL (ref 8.5–10.1)
CHLORIDE SERPL-SCNC: 106 MMOL/L (ref 98–112)
CO2 SERPL-SCNC: 27 MMOL/L (ref 21–32)
CREAT BLD-MCNC: 1.54 MG/DL
EST. AVERAGE GLUCOSE BLD GHB EST-MCNC: 114 MG/DL (ref 68–126)
FASTING STATUS PATIENT QL REPORTED: YES
GFR SERPLBLD BASED ON 1.73 SQ M-ARVRAT: 34 ML/MIN/1.73M2 (ref 60–?)
GLUCOSE BLD-MCNC: 88 MG/DL (ref 70–99)
HBA1C MFR BLD: 5.6 % (ref ?–5.7)
OSMOLALITY SERPL CALC.SUM OF ELEC: 295 MOSM/KG (ref 275–295)
POTASSIUM SERPL-SCNC: 4.3 MMOL/L (ref 3.5–5.1)
SODIUM SERPL-SCNC: 140 MMOL/L (ref 136–145)

## 2023-02-04 PROCEDURE — 83036 HEMOGLOBIN GLYCOSYLATED A1C: CPT

## 2023-02-04 PROCEDURE — 36415 COLL VENOUS BLD VENIPUNCTURE: CPT

## 2023-02-04 PROCEDURE — 87086 URINE CULTURE/COLONY COUNT: CPT

## 2023-02-04 PROCEDURE — 80048 BASIC METABOLIC PNL TOTAL CA: CPT

## 2023-02-23 RX ORDER — OXYBUTYNIN CHLORIDE 5 MG/1
5 TABLET, EXTENDED RELEASE ORAL DAILY
Qty: 90 TABLET | Refills: 1 | Status: SHIPPED | OUTPATIENT
Start: 2023-02-23

## 2023-02-23 NOTE — TELEPHONE ENCOUNTER
Refill passed per [place] [employer], [facility] protocol    Requested Prescriptions   Pending Prescriptions Disp Refills    OXYBUTYNIN ER 5 MG Oral Tablet 24 Hr [Pharmacy Med Name: OXYBUTYNIN CL ER 5 MG TABLET] 90 tablet 1     Sig: Take 1 tablet (5 mg total) by mouth daily.        Genitourinary Medications Passed - 2/23/2023 12:11 AM        Passed - Patient does not have pulmonary hypertension on problem list        Passed - In person appointment or virtual visit in the past 12 mos or appointment in next 3 mos     Recent Outpatient Visits              6 months ago SHAMAR (acute kidney injury) ([facility] [address]    [address] Hensley MD    Office Visit    8 months ago [facility], [employer], [address], APRN    Office Visit    10 months ago Acute cystitis without hematuria    [address] Al[address] Hensley MD    Office Visit    1 year ago Annual physical exam    [address] Al[address] Hensley MD    Office Visit    1 year ago Essential hypertension    [address] Hensley MD    Office Visit

## 2023-04-21 RX ORDER — METOPROLOL SUCCINATE 50 MG/1
50 TABLET, EXTENDED RELEASE ORAL DAILY
Qty: 90 TABLET | Refills: 3 | Status: SHIPPED | OUTPATIENT
Start: 2023-04-21

## 2023-04-21 NOTE — TELEPHONE ENCOUNTER
HDmessaging message sent. CSS=please call and assist.     No future appointments. Please review; protocol failed/no protocol.      Requested Prescriptions   Pending Prescriptions Disp Refills    METOPROLOL SUCCINATE ER 50 MG Oral Tablet 24 Hr [Pharmacy Med Name: METOPROLOL SUCC ER 50 MG TAB] 90 tablet 1     Sig: TAKE 1 TABLET BY MOUTH EVERY DAY       Hypertensive Medications Protocol Failed - 4/21/2023  2:49 AM        Failed - In person appointment or virtual visit in the past 6 months     Recent Outpatient Visits              8 months ago [facility]    Debbie Rivers MD    Office Visit    10 months ago [facility], [address] [facility], [employer], [place]    Office Visit    1 year ago Acute cystitis without hematuria    Debbei Rivers MD    Office Visit    1 year ago Annual physical exam    [facility]    [facility] Visit    1 year ago Essential hypertension    [address] Los Santos MD    Office Visit                      Failed - EGFRCR or GFRNAA > 50     GFR Evaluation  EGFRCR: 34 , resulted on 2/4/2023            Passed - In person appointment in the past 12 or next 3 months     Recent Outpatient Visits              8 months ago SHAMAR (acute kidney injury) [facility])    Debbie Rivers MD    Office Visit    10 months ago [address] [facility], [employer], [place]    Office Visit    1 year ago Acute cystitis without hematuria    Debbie Rivers MD    Office Visit    1 year ago Annual physical exam    Debbie Rivers MD    Office Visit 1 year ago Essential hypertension    [address] MD    Office Visit                      Passed - Last BP reading less than 140/90     BP Readings from Last 1 Encounters:  08/03/22 : 103/66                Passed - CMP or BMP in past 6 months     Recent Results (from the past 4392 hour(s))   BASIC METABOLIC PANEL (8)    Collection Time: 02/04/23  9:54 AM   Result Value Ref Range    Glucose 88 70 - 99 mg/dL    Sodium 140 136 - 145 mmol/L    Potassium 4.3 3.5 - 5.1 mmol/L    Chloride 106 98 - 112 mmol/L    CO2 27.0 21.0 - 32.0 mmol/L    Anion Gap 7 0 - 18 mmol/L    BUN 27 (H) 7 - 18 mg/dL    Creatinine 1.54 (H) 0.55 - 1.02 mg/dL    BUN/CREA Ratio 17.5 10.0 - 20.0    Calcium, Total 9.8 8.5 - 10.1 mg/dL    Calculated Osmolality 295 275 - 295 mOsm/kg    eGFR-Cr 34 (L) >=60 mL/min/1.73m2    Patient Fasting for BMP? Yes      *Note: Due to a large number of results and/or encounters for the requested time period, some results have not been displayed. A complete set of results can be found in Results Review.                        Recent Outpatient Visits              8 months ago SHAMAR (acute kidney injury) [facility])    [address] MD    Office Visit    10 months ago [address] [facility], [employer], [place]    Office Visit    1 year ago Acute cystitis without hematuria    [address] MD    Office Visit    1 year ago Annual physical exam    [address] MD    Office Visit    1 year ago Essential hypertension    [address] MD    Office Visit

## 2023-04-24 NOTE — TELEPHONE ENCOUNTER
See below    Appointment Information:     Provider:     Shine Fowler MD   Date:         6/12/2023  Time:         10:00 AM CDT  Dept:        [facility]'[address] [address]  [phone]

## 2023-06-12 ENCOUNTER — OFFICE VISIT (OUTPATIENT)
Dept: INTERNAL MEDICINE CLINIC | Facility: CLINIC | Age: 83
End: 2023-06-12

## 2023-06-12 VITALS
HEIGHT: 62 IN | WEIGHT: 140 LBS | HEART RATE: 60 BPM | TEMPERATURE: 97 F | BODY MASS INDEX: 25.76 KG/M2 | SYSTOLIC BLOOD PRESSURE: 138 MMHG | DIASTOLIC BLOOD PRESSURE: 85 MMHG

## 2023-06-12 DIAGNOSIS — N18.30 STAGE 3 CHRONIC KIDNEY DISEASE, UNSPECIFIED WHETHER STAGE 3A OR 3B CKD (HCC): Primary | ICD-10-CM

## 2023-06-12 DIAGNOSIS — Z78.0 MENOPAUSE: ICD-10-CM

## 2023-06-12 DIAGNOSIS — Z12.31 ENCOUNTER FOR SCREENING MAMMOGRAM FOR MALIGNANT NEOPLASM OF BREAST: ICD-10-CM

## 2023-06-12 PROBLEM — N18.4 CHRONIC RENAL DISEASE, STAGE IV (HCC): Status: ACTIVE | Noted: 2023-06-12

## 2023-06-12 PROCEDURE — 3079F DIAST BP 80-89 MM HG: CPT | Performed by: INTERNAL MEDICINE

## 2023-06-12 PROCEDURE — 36415 COLL VENOUS BLD VENIPUNCTURE: CPT | Performed by: INTERNAL MEDICINE

## 2023-06-12 PROCEDURE — 99214 OFFICE O/P EST MOD 30 MIN: CPT | Performed by: INTERNAL MEDICINE

## 2023-06-12 PROCEDURE — 3008F BODY MASS INDEX DOCD: CPT | Performed by: INTERNAL MEDICINE

## 2023-06-12 PROCEDURE — 3075F SYST BP GE 130 - 139MM HG: CPT | Performed by: INTERNAL MEDICINE

## 2023-06-13 LAB
ANION GAP SERPL CALC-SCNC: 8 MMOL/L (ref 0–18)
BUN BLD-MCNC: 24 MG/DL (ref 7–18)
BUN/CREAT SERPL: 18.9 (ref 10–20)
CALCIUM BLD-MCNC: 10.3 MG/DL (ref 8.5–10.1)
CHLORIDE SERPL-SCNC: 103 MMOL/L (ref 98–112)
CO2 SERPL-SCNC: 29 MMOL/L (ref 21–32)
CREAT BLD-MCNC: 1.27 MG/DL
FASTING STATUS PATIENT QL REPORTED: NO
GFR SERPLBLD BASED ON 1.73 SQ M-ARVRAT: 43 ML/MIN/1.73M2 (ref 60–?)
GLUCOSE BLD-MCNC: 65 MG/DL (ref 70–99)
OSMOLALITY SERPL CALC.SUM OF ELEC: 292 MOSM/KG (ref 275–295)
POTASSIUM SERPL-SCNC: 4.2 MMOL/L (ref 3.5–5.1)
SODIUM SERPL-SCNC: 140 MMOL/L (ref 136–145)

## 2023-06-20 NOTE — TELEPHONE ENCOUNTER
Refilled per Epic protocol.     Hypertensive Medications  Protocol Criteria:  · Appointment scheduled in the past 6 months or in the next 3 months  · BMP or CMP in the past 12 months  · Creatinine result < 2  Recent Outpatient Visits            5 months ago If you are a smoker, it is important for your health to stop smoking. Please be aware that second hand smoke is also harmful.

## 2023-12-27 ENCOUNTER — OFFICE VISIT (OUTPATIENT)
Dept: INTERNAL MEDICINE CLINIC | Facility: CLINIC | Age: 83
End: 2023-12-27
Payer: COMMERCIAL

## 2023-12-27 VITALS
HEIGHT: 62 IN | BODY MASS INDEX: 25.76 KG/M2 | TEMPERATURE: 97 F | DIASTOLIC BLOOD PRESSURE: 70 MMHG | OXYGEN SATURATION: 97 % | SYSTOLIC BLOOD PRESSURE: 138 MMHG | HEART RATE: 62 BPM | WEIGHT: 140 LBS

## 2023-12-27 DIAGNOSIS — Z00.00 ANNUAL PHYSICAL EXAM: Primary | ICD-10-CM

## 2023-12-27 LAB
ALBUMIN SERPL-MCNC: 4.4 G/DL (ref 3.2–4.8)
ALBUMIN/GLOB SERPL: 1.6 {RATIO} (ref 1–2)
ALP LIVER SERPL-CCNC: 72 U/L
ALT SERPL-CCNC: 11 U/L
ANION GAP SERPL CALC-SCNC: 5 MMOL/L (ref 0–18)
AST SERPL-CCNC: 19 U/L (ref ?–34)
BASOPHILS # BLD AUTO: 0.04 X10(3) UL (ref 0–0.2)
BASOPHILS NFR BLD AUTO: 0.7 %
BILIRUB SERPL-MCNC: 0.3 MG/DL (ref 0.2–1.1)
BUN BLD-MCNC: 14 MG/DL (ref 9–23)
BUN/CREAT SERPL: 12.6 (ref 10–20)
CALCIUM BLD-MCNC: 9.8 MG/DL (ref 8.7–10.4)
CHLORIDE SERPL-SCNC: 103 MMOL/L (ref 98–112)
CHOLEST SERPL-MCNC: 226 MG/DL (ref ?–200)
CO2 SERPL-SCNC: 29 MMOL/L (ref 21–32)
CREAT BLD-MCNC: 1.11 MG/DL
DEPRECATED RDW RBC AUTO: 43.8 FL (ref 35.1–46.3)
EGFRCR SERPLBLD CKD-EPI 2021: 50 ML/MIN/1.73M2 (ref 60–?)
EOSINOPHIL # BLD AUTO: 0.15 X10(3) UL (ref 0–0.7)
EOSINOPHIL NFR BLD AUTO: 2.7 %
ERYTHROCYTE [DISTWIDTH] IN BLOOD BY AUTOMATED COUNT: 12.8 % (ref 11–15)
FASTING PATIENT LIPID ANSWER: YES
FASTING STATUS PATIENT QL REPORTED: YES
GLOBULIN PLAS-MCNC: 2.8 G/DL (ref 2.8–4.4)
GLUCOSE BLD-MCNC: 83 MG/DL (ref 70–99)
HCT VFR BLD AUTO: 43.5 %
HDLC SERPL-MCNC: 61 MG/DL (ref 40–59)
HGB BLD-MCNC: 13.7 G/DL
IMM GRANULOCYTES # BLD AUTO: 0.01 X10(3) UL (ref 0–1)
IMM GRANULOCYTES NFR BLD: 0.2 %
LDLC SERPL CALC-MCNC: 120 MG/DL (ref ?–100)
LYMPHOCYTES # BLD AUTO: 1.44 X10(3) UL (ref 1–4)
LYMPHOCYTES NFR BLD AUTO: 25.5 %
MCH RBC QN AUTO: 29 PG (ref 26–34)
MCHC RBC AUTO-ENTMCNC: 31.5 G/DL (ref 31–37)
MCV RBC AUTO: 92 FL
MONOCYTES # BLD AUTO: 0.44 X10(3) UL (ref 0.1–1)
MONOCYTES NFR BLD AUTO: 7.8 %
NEUTROPHILS # BLD AUTO: 3.57 X10 (3) UL (ref 1.5–7.7)
NEUTROPHILS # BLD AUTO: 3.57 X10(3) UL (ref 1.5–7.7)
NEUTROPHILS NFR BLD AUTO: 63.1 %
NONHDLC SERPL-MCNC: 165 MG/DL (ref ?–130)
OSMOLALITY SERPL CALC.SUM OF ELEC: 284 MOSM/KG (ref 275–295)
PLATELET # BLD AUTO: 311 10(3)UL (ref 150–450)
POTASSIUM SERPL-SCNC: 3.8 MMOL/L (ref 3.5–5.1)
PROT SERPL-MCNC: 7.2 G/DL (ref 5.7–8.2)
RBC # BLD AUTO: 4.73 X10(6)UL
SODIUM SERPL-SCNC: 137 MMOL/L (ref 136–145)
TRIGL SERPL-MCNC: 259 MG/DL (ref 30–149)
TSI SER-ACNC: 2.12 MIU/ML (ref 0.55–4.78)
VLDLC SERPL CALC-MCNC: 46 MG/DL (ref 0–30)
WBC # BLD AUTO: 5.7 X10(3) UL (ref 4–11)

## 2023-12-27 PROCEDURE — 99397 PER PM REEVAL EST PAT 65+ YR: CPT | Performed by: INTERNAL MEDICINE

## 2023-12-27 PROCEDURE — 3075F SYST BP GE 130 - 139MM HG: CPT | Performed by: INTERNAL MEDICINE

## 2023-12-27 PROCEDURE — 3008F BODY MASS INDEX DOCD: CPT | Performed by: INTERNAL MEDICINE

## 2023-12-27 PROCEDURE — 36415 COLL VENOUS BLD VENIPUNCTURE: CPT | Performed by: INTERNAL MEDICINE

## 2023-12-27 PROCEDURE — 3078F DIAST BP <80 MM HG: CPT | Performed by: INTERNAL MEDICINE

## 2024-05-08 NOTE — TELEPHONE ENCOUNTER
Patient is requesting a prescription refill and was advised by the pharmacy to contact the provider    Metoprolol    [facility] in [place] confirmed.    Please advise

## 2024-05-09 NOTE — TELEPHONE ENCOUNTER
Please review;  [facility] protocol failed/ No protocol     Requested Prescriptions   Pending Prescriptions Disp Refills    METOPROLOL SUCCINATE ER 50 MG Oral Tablet 24 Hr [Pharmacy Med Name: METOPROLOL SUCC ER 50 MG TAB] 90 tablet 3     Sig: TAKE 1 TABLET BY MOUTH EVERY DAY       Hypertension Medications Protocol Failed - 5/8/2024  4:29 PM        Failed - EGFRCR or GFRNAA > 50     GFR Evaluation  EGFRCR: 50 , resulted on 12/27/2023          Passed - CMP or BMP in past 12 months        Passed - Last BP reading less than 140/90     BP Readings from Last 1 Encounters:   12/27/23 138/70               Passed - In person appointment or virtual visit in the past 12 mos or appointment in next 3 mos     Recent Outpatient Visits              4 months ago Annual physical exam    [facility], Keila Conroy MD    Office Visit    11 months ago Stage 3 chronic kidney disease, unspecified whether stage 3a or 3b CKD ([facility])    [facility], Keila Conroy MD    Office Visit    1 year ago SHAMAR (acute kidney injury) ([facility]    Office Visit    1 year ago Microhematuria    [facility], Iman Kennedy APRN    Office Visit    2 years ago Acute cystitis without hematuria    [facility], Keila Conroy MD    Office Visit                           Recent Outpatient Visits              4 months ago Annual physical exam    [facility]    Office Visit    11 months ago Stage 3 chronic kidney disease, unspecified whether stage 3a or 3b CKD ([facility])    [facility]    Office Visit    1 year ago SHAMAR (acute kidney injury) ([facility])    [facility],  Keila Conroy MD    Office Visit    1 year ago Microhematuria    [facility], Iman Kennedy APRN    Office Visit    2 years ago Acute cystitis without hematuria    [facility], Keila Conroy MD    Office Visit             [Initial Evaluation] : an initial evaluation for [FreeTextEntry2] : face mass

## 2024-05-10 RX ORDER — METOPROLOL SUCCINATE 50 MG/1
50 TABLET, EXTENDED RELEASE ORAL DAILY
Qty: 90 TABLET | Refills: 3 | Status: SHIPPED | OUTPATIENT
Start: 2024-05-10

## 2024-10-31 ENCOUNTER — TELEPHONE (OUTPATIENT)
Dept: INTERNAL MEDICINE CLINIC | Facility: CLINIC | Age: 84
End: 2024-10-31

## 2024-10-31 NOTE — TELEPHONE ENCOUNTER
Called patient and advised her of her care gaps.  She advised that she will call back and make the appointment as she still works.  She needs to fit the visit in.

## 2024-11-25 ENCOUNTER — OFFICE VISIT (OUTPATIENT)
Dept: INTERNAL MEDICINE CLINIC | Facility: CLINIC | Age: 84
End: 2024-11-25
Payer: COMMERCIAL

## 2024-11-25 VITALS
HEIGHT: 62 IN | WEIGHT: 137 LBS | SYSTOLIC BLOOD PRESSURE: 155 MMHG | HEART RATE: 63 BPM | TEMPERATURE: 97 F | OXYGEN SATURATION: 99 % | BODY MASS INDEX: 25.21 KG/M2 | DIASTOLIC BLOOD PRESSURE: 90 MMHG

## 2024-11-25 DIAGNOSIS — I10 PRIMARY HYPERTENSION: Primary | ICD-10-CM

## 2024-11-25 DIAGNOSIS — Z00.00 ANNUAL PHYSICAL EXAM: ICD-10-CM

## 2024-11-25 DIAGNOSIS — Z78.0 MENOPAUSE: ICD-10-CM

## 2024-11-25 NOTE — PROGRESS NOTES
Subjective:     Patient ID: Kassy Kimbrough is a 84 year old female.    Hypertension        History/Other:   She came in today for follow-up on her blood pressure.  She is checking her blood pressure at home and according to her is well-controlled her blood pressure at home is around 130.  She takes her medications regularly.  She always gets anxious when she comes to the doctor's office.  Review of Systems   Constitutional: Negative.    HENT: Negative.     Respiratory: Negative.     Cardiovascular: Negative.    Gastrointestinal: Negative.    Genitourinary: Negative.    Musculoskeletal: Negative.    Skin: Negative.    Neurological: Negative.    Hematological: Negative.    Psychiatric/Behavioral: Negative.       Current Outpatient Medications   Medication Sig Dispense Refill    metoprolol succinate ER 50 MG Oral Tablet 24 Hr Take 1 tablet (50 mg total) by mouth daily. 90 tablet 3    Multiple Vitamins-Minerals (MULTIVITAMIN GUMMIES ADULT OR) Take 2 each by mouth daily.      MAGNESIUM OR Take 250 mg by mouth daily.       Allergies:Allergies[1]    Past Medical History:    Allergic rhinitis    Arthritis    Bell's palsy    Calculus of kidney    History of Papanicolaou smear of cervix    Unspecified essential hypertension      Past Surgical History:   Procedure Laterality Date    Colonoscopy            4 children [id number]    Tonsillectomy        Family History   Problem Relation Age of Onset    Hypertension Mother     Breast Cancer Sister     Cancer Sister         Breast and uterine    Cancer Father         Lung    Cancer Daughter         Endometrial cancer      Social History:   Social History     Socioeconomic History    Marital status:    Tobacco Use    Smoking status: Former    Smokeless tobacco: Never    Tobacco comments:     Quit around    Substance and Sexual Activity    Alcohol use: No    Drug use: Never   Other Topics Concern    Caffeine Concern Yes        Objective:   Physical Exam  Vitals and  nursing note reviewed.   Constitutional:       Appearance: Normal appearance.   HENT:      Head: Normocephalic and atraumatic.   Cardiovascular:      Rate and Rhythm: Normal rate and regular rhythm.      Pulses: Normal pulses.      Heart sounds: Normal heart sounds.   Pulmonary:      Effort: Pulmonary effort is normal.      Breath sounds: Normal breath sounds.   Abdominal:      Palpations: Abdomen is soft.   Musculoskeletal:         General: Normal range of motion.      Cervical back: Normal range of motion and neck supple.   Skin:     General: Skin is warm.   Neurological:      Mental Status: She is alert. Mental status is at baseline.   Psychiatric:         Mood and Affect: Mood normal.         Assessment & Plan:   Htn-noted on the higher side I did advise her to check her blood pressure at home and call us with blood pressure readings in 2 to 3 days.  She does have component of whitecoat syndrome    Menopause asymptomatic I will order bone density scan  Refusing mammogram    Orders Placed This Encounter   Procedures    CBC W Differential W Platelet [E]    Comp Metabolic Panel (14)    TSH W Reflex To Free T4 [E]    Lipid Panel [E]    Hemoglobin A1C [E]       Meds This Visit:  Requested Prescriptions      No prescriptions requested or ordered in this encounter       Imaging & Referrals:  XR DEXA BONE DENSITOMETRY (CPT=77080)            [1]   Allergies  Allergen Reactions    Bananas     Sulfamethoxazole      Other reaction(s): tongue swells, mouth blisters    Trimethoprim      Other reaction(s): tongue swells, mouth blisters

## 2024-12-30 ENCOUNTER — HOSPITAL ENCOUNTER (OUTPATIENT)
Dept: BONE DENSITY | Facility: HOSPITAL | Age: 84
Discharge: HOME | End: 2024-12-30
Attending: INTERNAL MEDICINE
Payer: COMMERCIAL

## 2024-12-30 DIAGNOSIS — Z78.0 MENOPAUSE: ICD-10-CM

## 2024-12-30 PROCEDURE — 77080 DXA BONE DENSITY AXIAL: CPT | Performed by: INTERNAL MEDICINE

## 2025-05-06 RX ORDER — METOPROLOL SUCCINATE 50 MG/1
50 TABLET, EXTENDED RELEASE ORAL DAILY
Qty: 90 TABLET | Refills: 1 | Status: SHIPPED | OUTPATIENT
Start: 2025-05-06

## 2025-05-06 NOTE — TELEPHONE ENCOUNTER
Please review; protocol failed/No Protocol      Sent [email or web address].ithart message to patient to complete labs from 11/25/2024

## 2025-05-20 ENCOUNTER — APPOINTMENT (OUTPATIENT)
Dept: GENERAL RADIOLOGY | Age: 85
End: 2025-05-20
Attending: EMERGENCY MEDICINE

## 2025-05-20 ENCOUNTER — HOSPITAL ENCOUNTER (EMERGENCY)
Age: 85
Discharge: HOME | End: 2025-05-20
Attending: EMERGENCY MEDICINE

## 2025-05-20 ENCOUNTER — APPOINTMENT (OUTPATIENT)
Dept: CT IMAGING | Age: 85
End: 2025-05-20
Attending: SURGERY

## 2025-05-20 VITALS
OXYGEN SATURATION: 98 % | SYSTOLIC BLOOD PRESSURE: 168 MMHG | TEMPERATURE: 97.9 F | DIASTOLIC BLOOD PRESSURE: 70 MMHG | RESPIRATION RATE: 20 BRPM | HEART RATE: 95 BPM
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
## 2025-05-20 LAB
ABO + RH BLD: NORMAL
ANION GAP SERPL CALC-SCNC: 12 MMOL/L (ref 7–19)
APTT PPP: 30 SEC (ref 22–32)
BASOPHILS # BLD: 0 K/MCL (ref 0–0.3)
BASOPHILS NFR BLD: 1 %
BLD GP AB SCN SERPL QL GEL: NEGATIVE
BUN SERPL-MCNC: 16 MG/DL (ref 6–20)
BUN/CREAT SERPL: 14 (ref 7–25)
CALCIUM SERPL-MCNC: 9.4 MG/DL (ref 8.4–10.2)
CHLORIDE SERPL-SCNC: 105 MMOL/L (ref 97–110)
CO2 SERPL-SCNC: 28 MMOL/L (ref 21–32)
CREAT SERPL-MCNC: 1.13 MG/DL (ref 0.51–0.95)
DEPRECATED RDW RBC: 41.3 FL (ref 39–50)
EGFRCR SERPLBLD CKD-EPI 2021: 49 ML/MIN/{1.73_M2}
EOSINOPHIL # BLD: 0.2 K/MCL (ref 0–0.5)
EOSINOPHIL NFR BLD: 3 %
ERYTHROCYTE [DISTWIDTH] IN BLOOD: 12.6 % (ref 11–15)
ETHANOL SERPL-MCNC: NORMAL MG/DL
FASTING DURATION TIME PATIENT: ABNORMAL H
GLUCOSE SERPL-MCNC: 101 MG/DL (ref 70–99)
HCT VFR BLD CALC: 37.1 % (ref 36–46.5)
HGB BLD-MCNC: 12.4 G/DL (ref 12–15.5)
IMM GRANULOCYTES # BLD AUTO: 0 K/MCL (ref 0–0.2)
IMM GRANULOCYTES # BLD: 1 %
INR PPP: 1.1
LACTATE BLDV-SCNC: 1.5 MMOL/L (ref 0–2)
LYMPHOCYTES # BLD: 1.4 K/MCL (ref 1–4)
LYMPHOCYTES NFR BLD: 27 %
MCH RBC QN AUTO: 30.3 PG (ref 26–34)
MCHC RBC AUTO-ENTMCNC: 33.4 G/DL (ref 32–36.5)
MCV RBC AUTO: 90.7 FL (ref 78–100)
MONOCYTES # BLD: 0.5 K/MCL (ref 0.3–0.9)
MONOCYTES NFR BLD: 10 %
NEUTROPHILS # BLD: 3 K/MCL (ref 1.8–7.7)
NEUTROPHILS NFR BLD: 58 %
NRBC BLD MANUAL-RTO: 0 /100 WBC
PLATELET # BLD AUTO: 273 K/MCL (ref 140–450)
POTASSIUM SERPL-SCNC: 3.2 MMOL/L (ref 3.4–5.1)
PROTHROMBIN TIME: 12 SEC (ref 9.7–11.8)
RBC # BLD: 4.09 MIL/MCL (ref 4–5.2)
SODIUM SERPL-SCNC: 142 MMOL/L (ref 135–145)
TYPE AND SCREEN EXPIRATION DATE: NORMAL
WBC # BLD: 5 K/MCL (ref 4.2–11)

## 2025-05-20 PROCEDURE — 99285 EMERGENCY DEPT VISIT HI MDM: CPT | Performed by: EMERGENCY MEDICINE

## 2025-05-20 PROCEDURE — 76705 ECHO EXAM OF ABDOMEN: CPT

## 2025-05-20 PROCEDURE — 86850 RBC ANTIBODY SCREEN: CPT | Performed by: SURGERY

## 2025-05-20 PROCEDURE — 71045 X-RAY EXAM CHEST 1 VIEW: CPT

## 2025-05-20 PROCEDURE — 10002803 HB RX 637

## 2025-05-20 PROCEDURE — 72125 CT NECK SPINE W/O DYE: CPT

## 2025-05-20 PROCEDURE — 99284 EMERGENCY DEPT VISIT MOD MDM: CPT | Performed by: EMERGENCY MEDICINE

## 2025-05-20 PROCEDURE — 10003579 HB TRAUMA W/O CRITICAL CARE

## 2025-05-20 PROCEDURE — 72170 X-RAY EXAM OF PELVIS: CPT

## 2025-05-20 PROCEDURE — 10004180 HB COUNTER-TRANSPORT

## 2025-05-20 PROCEDURE — 85025 COMPLETE CBC W/AUTO DIFF WBC: CPT | Performed by: EMERGENCY MEDICINE

## 2025-05-20 PROCEDURE — 74176 CT ABD & PELVIS W/O CONTRAST: CPT

## 2025-05-20 PROCEDURE — 76604 US EXAM CHEST: CPT

## 2025-05-20 PROCEDURE — 99283 EMERGENCY DEPT VISIT LOW MDM: CPT

## 2025-05-20 PROCEDURE — 83605 ASSAY OF LACTIC ACID: CPT | Performed by: EMERGENCY MEDICINE

## 2025-05-20 PROCEDURE — 93308 TTE F-UP OR LMTD: CPT

## 2025-05-20 PROCEDURE — 80048 BASIC METABOLIC PNL TOTAL CA: CPT | Performed by: EMERGENCY MEDICINE

## 2025-05-20 PROCEDURE — 85730 THROMBOPLASTIN TIME PARTIAL: CPT | Performed by: EMERGENCY MEDICINE

## 2025-05-20 PROCEDURE — 85610 PROTHROMBIN TIME: CPT | Performed by: EMERGENCY MEDICINE

## 2025-05-20 PROCEDURE — 82077 ASSAY SPEC XCP UR&BREATH IA: CPT | Performed by: EMERGENCY MEDICINE

## 2025-05-20 PROCEDURE — 70450 CT HEAD/BRAIN W/O DYE: CPT

## 2025-05-20 RX ORDER — ACETAMINOPHEN 500 MG
1000 TABLET ORAL EVERY 8 HOURS PRN
Status: SHIPPED | COMMUNITY
Start: 2025-05-20 | End: 2025-05-30

## 2025-05-20 RX ORDER — POTASSIUM CHLORIDE 1500 MG/1
20 TABLET, EXTENDED RELEASE ORAL ONCE
Status: COMPLETED | OUTPATIENT
Start: 2025-05-20 | End: 2025-05-20

## 2025-05-20 RX ADMIN — POTASSIUM CHLORIDE 20 MEQ: 1500 TABLET, EXTENDED RELEASE ORAL at 17:52

## 2025-05-20 SDOH — SOCIAL STABILITY: SOCIAL INSECURITY: HOW OFTEN DOES ANYONE, INCLUDING FAMILY AND FRIENDS, INSULT OR TALK DOWN TO YOU?: NEVER

## 2025-05-20 SDOH — SOCIAL STABILITY: SOCIAL INSECURITY: HOW OFTEN DOES ANYONE, INCLUDING FAMILY AND FRIENDS, THREATEN YOU WITH HARM?: NEVER

## 2025-05-20 SDOH — SOCIAL STABILITY: SOCIAL INSECURITY: HOW OFTEN DOES ANYONE, INCLUDING FAMILY AND FRIENDS, SCREAM OR CURSE AT YOU?: NEVER

## 2025-05-20 SDOH — SOCIAL STABILITY: SOCIAL INSECURITY: HOW OFTEN DOES ANYONE, INCLUDING FAMILY AND FRIENDS, PHYSICALLY HURT YOU?: NEVER

## 2025-05-20 ASSESSMENT — PAIN SCALES - GENERAL
PAINLEVEL_OUTOF10: 7
PAINLEVEL_OUTOF10: 0

## 2025-05-20 ASSESSMENT — PAIN DESCRIPTION - PAIN TYPE: TYPE: ACUTE PAIN

## 2025-05-21 ENCOUNTER — PATIENT MESSAGE (OUTPATIENT)
Dept: INTERNAL MEDICINE CLINIC | Facility: CLINIC | Age: 85
End: 2025-05-21

## 2025-05-21 LAB
RAINBOW EXTRA TUBES HOLD SPECIMEN: NORMAL
RAINBOW EXTRA TUBES HOLD SPECIMEN: NORMAL

## 2025-06-07 ENCOUNTER — OFFICE VISIT (OUTPATIENT)
Dept: INTERNAL MEDICINE CLINIC | Facility: CLINIC | Age: 85
End: 2025-06-07
Payer: COMMERCIAL

## 2025-06-07 VITALS
BODY MASS INDEX: 24.84 KG/M2 | HEART RATE: 62 BPM | WEIGHT: 135 LBS | RESPIRATION RATE: 16 BRPM | DIASTOLIC BLOOD PRESSURE: 70 MMHG | HEIGHT: 62 IN | OXYGEN SATURATION: 100 % | SYSTOLIC BLOOD PRESSURE: 135 MMHG

## 2025-06-07 DIAGNOSIS — I10 PRIMARY HYPERTENSION: Primary | ICD-10-CM

## 2025-06-07 PROCEDURE — 3008F BODY MASS INDEX DOCD: CPT | Performed by: INTERNAL MEDICINE

## 2025-06-07 PROCEDURE — 99214 OFFICE O/P EST MOD 30 MIN: CPT | Performed by: INTERNAL MEDICINE

## 2025-06-07 PROCEDURE — 3075F SYST BP GE 130 - 139MM HG: CPT | Performed by: INTERNAL MEDICINE

## 2025-06-07 PROCEDURE — 3078F DIAST BP <80 MM HG: CPT | Performed by: INTERNAL MEDICINE
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
## (undated) NOTE — LETTER
Department: [facility], [facility], [address] [facility], [place]  Phone: [phone]  Ordering Provider: Nicholas Paul  Ordering Provider Address: [address] [place] [address]  Ordering Provider Phone: [phone]  Ordering Provider Fax: [phone] any questions related to insurance coverage. Thank you.     Children: Children under the age of 15 must have another adult caregiver with them. Please do not bring your child/children without a caregiver.  Because of the highly sensitive equipment and priv

## (undated) NOTE — LETTER
Department: [facility], [address] [facility], [place]  Phone: [phone]  Ordering Provider: Leyla Powell  Ordering Provider Address: [address] [place] [address]  Ordering Provider Phone: [phone]  Ordering Provider Fax: [phone] with them. Please do not bring your child/children without a caregiver.  Because of the highly sensitive equipment and privacy of all our patients, children will not be permitted in the exam rooms, unless otherwise noted and in accordance with departmental